# Patient Record
Sex: MALE | Race: OTHER
[De-identification: names, ages, dates, MRNs, and addresses within clinical notes are randomized per-mention and may not be internally consistent; named-entity substitution may affect disease eponyms.]

---

## 2017-07-10 ENCOUNTER — HOSPITAL ENCOUNTER (EMERGENCY)
Dept: HOSPITAL 25 - ED | Age: 57
Discharge: HOME | End: 2017-07-10
Payer: COMMERCIAL

## 2017-07-10 VITALS — DIASTOLIC BLOOD PRESSURE: 73 MMHG | SYSTOLIC BLOOD PRESSURE: 131 MMHG

## 2017-07-10 DIAGNOSIS — F17.210: ICD-10-CM

## 2017-07-10 DIAGNOSIS — I44.4: ICD-10-CM

## 2017-07-10 DIAGNOSIS — J20.9: Primary | ICD-10-CM

## 2017-07-10 DIAGNOSIS — J44.1: ICD-10-CM

## 2017-07-10 LAB
ALBUMIN SERPL BCG-MCNC: 4.1 G/DL (ref 3.2–5.2)
ALP SERPL-CCNC: 80 U/L (ref 34–104)
ALT SERPL W P-5'-P-CCNC: 17 U/L (ref 7–52)
ANION GAP SERPL CALC-SCNC: 8 MMOL/L (ref 2–11)
AST SERPL-CCNC: 26 U/L (ref 13–39)
BUN SERPL-MCNC: 13 MG/DL (ref 6–24)
BUN/CREAT SERPL: 16.7 (ref 8–20)
CALCIUM SERPL-MCNC: 9.5 MG/DL (ref 8.6–10.3)
CHLORIDE SERPL-SCNC: 98 MMOL/L (ref 101–111)
CK SERPL-CCNC: 237 U/L (ref 10–223)
GLOBULIN SER CALC-MCNC: 3.3 G/DL (ref 2–4)
GLUCOSE SERPL-MCNC: 139 MG/DL (ref 70–100)
HCO3 SERPL-SCNC: 29 MMOL/L (ref 22–32)
HCT VFR BLD AUTO: 51 % (ref 42–52)
HGB BLD-MCNC: 17.2 G/DL (ref 14–18)
MCH RBC QN AUTO: 30 PG (ref 27–31)
MCHC RBC AUTO-ENTMCNC: 33 G/DL (ref 31–36)
MCV RBC AUTO: 90 FL (ref 80–94)
POTASSIUM SERPL-SCNC: 4 MMOL/L (ref 3.5–5)
PROT SERPL-MCNC: 7.4 G/DL (ref 6.4–8.9)
RBC # BLD AUTO: 5.72 10^6/UL (ref 4–5.4)
SODIUM SERPL-SCNC: 135 MMOL/L (ref 133–145)
TROPONIN I SERPL-MCNC: 0.01 NG/ML (ref ?–0.04)
WBC # BLD AUTO: 17.4 10^3/UL (ref 3.5–10.8)

## 2017-07-10 PROCEDURE — 80053 COMPREHEN METABOLIC PANEL: CPT

## 2017-07-10 PROCEDURE — 94640 AIRWAY INHALATION TREATMENT: CPT

## 2017-07-10 PROCEDURE — 82550 ASSAY OF CK (CPK): CPT

## 2017-07-10 PROCEDURE — 85610 PROTHROMBIN TIME: CPT

## 2017-07-10 PROCEDURE — 83605 ASSAY OF LACTIC ACID: CPT

## 2017-07-10 PROCEDURE — 87040 BLOOD CULTURE FOR BACTERIA: CPT

## 2017-07-10 PROCEDURE — 96360 HYDRATION IV INFUSION INIT: CPT

## 2017-07-10 PROCEDURE — 82553 CREATINE MB FRACTION: CPT

## 2017-07-10 PROCEDURE — 87185 SC STD ENZYME DETCJ PER NZM: CPT

## 2017-07-10 PROCEDURE — 84484 ASSAY OF TROPONIN QUANT: CPT

## 2017-07-10 PROCEDURE — 71275 CT ANGIOGRAPHY CHEST: CPT

## 2017-07-10 PROCEDURE — 87070 CULTURE OTHR SPECIMN AEROBIC: CPT

## 2017-07-10 PROCEDURE — 85379 FIBRIN DEGRADATION QUANT: CPT

## 2017-07-10 PROCEDURE — 85025 COMPLETE CBC W/AUTO DIFF WBC: CPT

## 2017-07-10 PROCEDURE — 93005 ELECTROCARDIOGRAM TRACING: CPT

## 2017-07-10 PROCEDURE — 86140 C-REACTIVE PROTEIN: CPT

## 2017-07-10 PROCEDURE — 87077 CULTURE AEROBIC IDENTIFY: CPT

## 2017-07-10 PROCEDURE — 83880 ASSAY OF NATRIURETIC PEPTIDE: CPT

## 2017-07-10 PROCEDURE — 36415 COLL VENOUS BLD VENIPUNCTURE: CPT

## 2017-07-10 PROCEDURE — 94760 N-INVAS EAR/PLS OXIMETRY 1: CPT

## 2017-07-10 PROCEDURE — 71010: CPT

## 2017-07-10 PROCEDURE — 87205 SMEAR GRAM STAIN: CPT

## 2017-07-10 PROCEDURE — 99284 EMERGENCY DEPT VISIT MOD MDM: CPT

## 2017-07-10 PROCEDURE — 85730 THROMBOPLASTIN TIME PARTIAL: CPT

## 2017-07-10 NOTE — RAD
Indication: Cough and shortness of breath. Symptoms for 2 days. Bronchitis. History of

tobacco use.



Comparison: June 27, 2017



Technique: Upright AP 0810 hours



Report: Elevated lung volumes and both diffuse mild prominence of the interstitial

markings and patchy rarefaction of the mid to upper lung zone interstitial markings. No

focal pulmonary lesion, compelling alveolar consolidation, pleural effusion, pneumothorax.

The heart, pulmonary vasculature, and mediastinal contours are unremarkable.



IMPRESSION: Stigmata of obstructive lung disease. No acute pulmonary or cardiac process

evident.

## 2017-07-10 NOTE — RAD
Indication: Shortness of breath, COPD with elevated d-dimer.



Contrast: Administered 60.9 ml of OMNIPAQUE 350 mgi/ml



CTA of the chest was performed after IV contrast administration. Coronal and sagittal

reconstructed images were obtained. The pulmonary arterial tree is well opacified. No

filling defect is present to suggest pulmonary embolus.



Inferior thyroid lobes are unremarkable. There is no mediastinal or hilar adenopathy

noted. The trachea and major bronchi appear patent. The lung fields demonstrate no

evidence of pleural fluid, nodules or masses.



The visualized bony structures are unremarkable.



IMPRESSION: No evidence of pulmonary embolus is noted.

## 2019-01-27 ENCOUNTER — HOSPITAL ENCOUNTER (EMERGENCY)
Dept: HOSPITAL 25 - ED | Age: 59
Discharge: HOME | End: 2019-01-27
Payer: COMMERCIAL

## 2019-01-27 VITALS — SYSTOLIC BLOOD PRESSURE: 149 MMHG | DIASTOLIC BLOOD PRESSURE: 96 MMHG

## 2019-01-27 DIAGNOSIS — J32.4: Primary | ICD-10-CM

## 2019-01-27 DIAGNOSIS — D49.0: ICD-10-CM

## 2019-01-27 DIAGNOSIS — F17.200: ICD-10-CM

## 2019-01-27 LAB
ALBUMIN SERPL BCG-MCNC: 3.2 G/DL (ref 3.2–5.2)
ALBUMIN/GLOB SERPL: 0.9 {RATIO} (ref 1–3)
ALP SERPL-CCNC: 83 U/L (ref 34–104)
ALT SERPL W P-5'-P-CCNC: 11 U/L (ref 7–52)
ANION GAP SERPL CALC-SCNC: 6 MMOL/L (ref 2–11)
AST SERPL-CCNC: 16 U/L (ref 13–39)
BASOPHILS # BLD AUTO: 0.1 10^3/UL (ref 0–0.2)
BUN SERPL-MCNC: 13 MG/DL (ref 6–24)
BUN/CREAT SERPL: 17.6 (ref 8–20)
CALCIUM SERPL-MCNC: 8.8 MG/DL (ref 8.6–10.3)
CHLORIDE SERPL-SCNC: 96 MMOL/L (ref 101–111)
EOSINOPHIL # BLD AUTO: 0 10^3/UL (ref 0–0.6)
GLOBULIN SER CALC-MCNC: 3.5 G/DL (ref 2–4)
GLUCOSE SERPL-MCNC: 136 MG/DL (ref 70–100)
HCO3 SERPL-SCNC: 34 MMOL/L (ref 22–32)
HCT VFR BLD AUTO: 42 % (ref 42–52)
HGB BLD-MCNC: 14.3 G/DL (ref 14–18)
LYMPHOCYTES # BLD AUTO: 1.6 10^3/UL (ref 1–4.8)
MCH RBC QN AUTO: 30 PG (ref 27–31)
MCHC RBC AUTO-ENTMCNC: 34 G/DL (ref 31–36)
MCV RBC AUTO: 87 FL (ref 80–94)
MONOCYTES # BLD AUTO: 0.7 10^3/UL (ref 0–0.8)
NEUTROPHILS # BLD AUTO: 10.5 10^3/UL (ref 1.5–7.7)
NRBC # BLD AUTO: 0 10^3/UL
NRBC BLD QL AUTO: 0
PLATELET # BLD AUTO: 332 10^3/UL (ref 150–450)
POTASSIUM SERPL-SCNC: 3.7 MMOL/L (ref 3.5–5)
PROT SERPL-MCNC: 6.7 G/DL (ref 6.4–8.9)
RBC # BLD AUTO: 4.77 10^6/UL (ref 4–5.4)
SODIUM SERPL-SCNC: 136 MMOL/L (ref 135–145)
WBC # BLD AUTO: 12.8 10^3/UL (ref 3.5–10.8)

## 2019-01-27 PROCEDURE — 96374 THER/PROPH/DIAG INJ IV PUSH: CPT

## 2019-01-27 PROCEDURE — 85025 COMPLETE CBC W/AUTO DIFF WBC: CPT

## 2019-01-27 PROCEDURE — 70491 CT SOFT TISSUE NECK W/DYE: CPT

## 2019-01-27 PROCEDURE — 99283 EMERGENCY DEPT VISIT LOW MDM: CPT

## 2019-01-27 PROCEDURE — 70486 CT MAXILLOFACIAL W/O DYE: CPT

## 2019-01-27 PROCEDURE — 36415 COLL VENOUS BLD VENIPUNCTURE: CPT

## 2019-01-27 PROCEDURE — 80053 COMPREHEN METABOLIC PANEL: CPT

## 2019-01-27 NOTE — XMS REPORT
Continuity of Care Document (CCD)

 Created on:2019



Patient:Milton Burton

Sex:Male

:1960

External Reference #:[id number]





Demographics







 Address  [address] [place], [place] [address]

 

 Home Phone  [phone]

 

 Mobile Phone  [phone]

 

 Work Phone  [phone]

 

 Email Address  [email or web address]

 

 Preferred Language  en

 

 Marital Status  Declined to Specify/Unknown

 

 Confucianism Affiliation  Unknown

 

 Race  White

 

 Ethnic Group  Declined to Specify/Unknown









Author







 Name  Harris Wilkerson MD

 

 Address  [address] [place] [address]









Care Team Providers







 Name  Role  Phone

 

 Harris Wilkerson MD FACP  Primary Care Physician  Unavailable









Payers







 Type  Date  Identification Numbers  Payment Provider  Subscriber

 

     Policy Number: [id number]  [employer]  Monica Burton









 PayID: [id number]   [id number]









 [place] [address]







Advance Directives







 Description

 

 No Information Available







Problems







 Date  Description  Provider  Status

 

 Onset: 2018  Impaired fasting glycaemia  Harris Wilkerson MD  Active

 

 Onset: 2018  Substance abuse counseling  Harris Wilkerson MD  Active

 

 Onset: 2018  H/O: peptic ulcer  Harris Wilkerson MD  Active

 

 Onset: 2017  Chronic obstructive lung disease  Harris Wilkerson MD  
Active

 

 Onset: 2009  Tobacco user  Harris Wilkerson MD  Active







Family History







 Date  Family Member(s)  Problem(s)  Comments

 

 :  (age 56  Father   due to MI  



 Years)      

 

   Father  Emphysema  Smoker

 

   Mother  Smoker  

 

 :  (age 64  Mother   due to Unknown Causes  "In her sleep"



 Years)      

 

   Mother  Arteriosclerosis  

 

   Mother  Peripheral Vascular Disease  



     (PVD)  

 

   Mother  Hypertension  

 

   First Brother  None  

 

   First Sister  None  







Social History







 Type  Date  Description  Comments

 

 Birth Sex    Unknown  

 

 Marital Status      2 boys

 

 Occupation    Manager  Property,



       owner/employed

 

 Work Status    Currently Working  

 

 Tobacco Use  Start: Unknown  current cigarette  2 PPD from age 20



     smoker  till 50. 1 PPD



       since 2009.

 

 Smoking Status  Reviewed: 10/24/17  current cigarette  2 PPD from age 20



     smoker  till 50. 1 PPD



       since 2009.

 

 ETOH Use  18 Years  Currently consumes  10-15 beer per week



     alcohol  "Average" "not



       daily"

 

 Recreational Drug Use    Denies Drug Use  

 

 Tobacco Use  Start: Unknown  Patient is a current  



     smoker, smokes every  



     day  

 

 Exercise Type/Frequency    Does not exercise  "Very active at



       work"

 

 Guns in Home    Yes, Locked Up  

 

 Currently Active    Patient is currently  



     sexually active  







Allergies, Adverse Reactions, Alerts







 Description

 

 No Known Drug Allergies







Medications







 Medication  Date  Status  Form  Strength  Qnty  SIG  Indications  Ordering



                 Provider

 

 Fluconazole    Active  Tablets  100mg  14tabs  take 1  B37.81  Iglesias 
A



   019          tablet by    MD Rhea



             mouth daily    



             for 14 days    



             for terence    



             infection    









 B37.0









 Ferrex 150  2018  Active  Capsules  150mg  60caps  take 1  D50.9  
Iglesias A



             capsule by    MD Rhea



             mouth twice    



             daily for    



             iron    



             deficiency    



             anemia    

 

 Atorvastatin  2018  Active  Tablets  20mg  30tabs  take one  Z82.49  
Iglesias A



 Calcium            tablet by    MD Rhea



             mouth in the    



             evening for    



             high    



             cholesterol    

 

 Omeprazole  08/10/2017  Active  Capsules  20mg  30caps  take 1  K26.9  
Iglesias A



       DR      capsule by    MD Rhea



             mouth once    



             daily    









 Z87.11









 Flovent HFA  07/10/2017  Active  Aerosol  220mcg/Act  24gm  inhale 1  J44.9  
Iglesias A



             puff twice a    MD Rhea



             day    

 

 Ventolin HFA  07/10/2017  Active  Aerosol  108(90Base)  8gm  inhale 2  J44.1  
Iglesias A



         mcg/Act    puffs every    MD Rhea



             4 hours as    



             needed for    



             shortness of    



             breath or    



             whee    









 J44.9









                 

 

 Nasonex  /  Hx  Suspension  50mcg  17gm  1 intranasal  J32.9  Iglesias A



   2019 -      /Act    twice a day    MD Rhea



   2019              

 

 Prednisone  /  Hx  Tablets  20mg  19tab  3 by mouth with  J01.9  Iglesias 
A



   2019 -        s  food daily for  0  MD Rhea



             3 days and then    



             2 daily for 3    



             days and then 1    



             daily for 4    



             days    

 

 Cefuroxime Axetil  /  Hx  Tablets  500mg      J01.9  Unknown



   2018 -            0  



   2019              

 

 Amoxicillin/Clavu  17/  Hx  Tablets  875-1    take 1 tablet  J01.9  Unknown



 lanate Potassium  2018 -      25mg    by mouth twice  0  



   /          a day for 7    



             days    

 

 Fluticasone  /  Hx  Suspension  50mcg    Instill 2  J01.9  Unknown



 Propionate  2018 -      /Act    Sprays Into  0  



   /          Each Nostil    



   2018          Once Daily    

 

 Wellbutrin SR  10/24/  Hx  Tablets ER 12HR  150mg  60tab  1 by mouth  Z71.6  
Iglesias A



    -        s  twice a day    MD Rhea



   10/24/              



   2017              

 

 Doxycycline  07/10/  Hx  Capsules  100mg    take 1 capsule  J44.1  [facility]



 Hyclate  2017 -          by mouth twice    Physician



   07/15/          a day    



   2017              

 

 Prednisone  07/10/  Hx  Tablets  20mg    take 2 tablets  J44.1  [facility]



   2017 -          by mouth once    Physician



   07/15/          daily    



   2017              

 

 Pravastatin  /  Hx  Tablets  20mg  30tab  1 po qd qhs  272.0  Iglesias A



 Sodium   -        s      MD Rhea



   2010              

 

 Meloxicam  /  Hx  Tablets  7.5mg  30tab  1-2 po qd  726.5  Iglesias A



    -        s      MD Rhea



   2010              

 

 Wellbutrin SR  /  Hx  Tablets ER 12HR  150mg  60tab  1 po bid  305.1  
Iglseias A



    -        s      MD Rhea



   2010              

 

 Zolpidem Tartrate  /  Hx  Tablets  10mg  5tabs  1/2-1 po qhs  780.5  
Harris LUQUE



    -          prn  2  MD Rhea



   2010              

 

 Chantix  /  Hx  Tablets  1mg  60tab  days 1-3 1  305.1  Iglesias REBEL



    -        s  once daily    MD Rhea



   /                     days    



   2010          4-7 1/ twice    



             daily    



                  day 8    



             onwards 1 twice    



             daily    







Immunizations







 CPT Code  Status  Date  Vaccine  Lot #

 

 [id number]  Given  10/25/2018  Influenza Vaccine Quadrivalent  [id number]



       Preser/Antibiotic Free Im Use  

 

 09699  Given  10/24/2017  Influenza Vaccine Quadrivalent  [id number]



       Preser/Antibiotic Free Im Use  

 

 03777  Given  2017  Pneumococcal Vaccine 2Yrs Or Older  [id number]

 

 16580  Given  2009  Tdap-Tetanus, Diphtheria  TDAP [id number] (2)



       Toxoids/Acellular Pertussis Vaccine 7+  







Vital Signs







 Date  Vital  Result  Comment

 

 2019 12:53pm  Body Temperature  98.2 F  

 

 2019 11:58am  Body Temperature  98.3 F  

 

 2019 10:43am  Body Temperature  97.8 F  









 Heart Rate  70 /min  

 

 BP Systolic  125 mmHg  

 

 BP Diastolic  75 mmHg  









 10/25/2018 11:56am  Heart Rate  64 /min  









 BP Systolic  140 mmHg  

 

 BP Diastolic  80 mmHg  

 

 BP Systolic Recheck  125 mmHg  

 

 BP Diastolic Recheck  75 mmHg  

 

 BMI (Body Mass Index)  23.6 kg/m2  

 

 Weight  144.00 lb  

 

 Height  65.5 inches  5'5.50"









 2018  9:57am  Heart Rate  60 /min  









 BP Systolic  110 mmHg  

 

 BP Diastolic  80 mmHg  

 

 Weight  145.00 lb  









 2018 11:24am  Heart Rate  66 /min  









 BP Systolic  125 mmHg  

 

 BP Diastolic  80 mmHg  

 

 Weight  143.00 lb  









 2018 11:53am  Heart Rate  72 /min  









 BP Systolic  135 mmHg  

 

 BP Diastolic  80 mmHg  

 

 Weight  144.00 lb  









 2017  2:07pm  Heart Rate  76 /min  









 BP Systolic  125 mmHg  

 

 BP Diastolic  80 mmHg  









 2017 11:53am  Weight  138.00 lb  

 

 10/24/2017 10:49am  Body Temperature  97.8 F  









 O2 % BldC Oximetry  96 %  

 

 Heart Rate  77 /min  

 

 BP Systolic  130 mmHg  

 

 BP Diastolic  80 mmHg  

 

 BMI (Body Mass Index)  22.6 kg/m2  

 

 Weight  138.00 lb  

 

 Height  65.50 inches  5'5.50"









 2017 12:07pm  Weight  133.00 lb  

 

 2017 11:12am  Weight  129.00 lb  

 

 2017 10:03am  Heart Rate  60 /min  









 BP Systolic  125 mmHg  

 

 BP Diastolic  80 mmHg  

 

 Weight  129.00 lb  









 2017 10:58am  Weight  129.00 lb  

 

 2017 11:18am  Weight  126.00 lb  

 

 2017 10:43am  Weight  134.00 lb  

 

 2017  1:11pm  Weight  128.00 lb  

 

 2017  2:44pm  Body Temperature  98.2 F  









 O2 % BldC Oximetry  95 %  

 

 Heart Rate  78 /min  

 

 BP Systolic  128 mmHg  

 

 BP Diastolic  80 mmHg  

 

 Weight  134.00 lb  









 2010 10:09am  Heart Rate  76 /min  









 BP Systolic  125 mmHg  

 

 BP Diastolic  85 mmHg  

 

 BMI (Body Mass Index)  26.5 kg/m2  

 

 Weight  164.00 lb  

 

 Height  66 inches  5'6"









 2010  5:31pm  Heart Rate  66 /min  









 BP Systolic  135 mmHg  

 

 BP Diastolic  85 mmHg  









 2010 12:48pm  Heart Rate  80 /min  









 BP Systolic  145 mmHg  

 

 BP Diastolic  85 mmHg  









 2010 10:31am  Body Temperature  98.0 F  









 Heart Rate  64 /min  

 

 BP Systolic  130 mmHg  Supine and standing

 

 BP Diastolic  80 mmHg  Supine and standing

 

 BMI (Body Mass Index)  27.1 kg/m2  

 

 Weight  168.00 lb  with shoes

 

 Height  66 inches  5'6"









 2009  9:13am  Body Temperature  98.5 F  









 O2 % BldC Oximetry  95 %  

 

 Heart Rate  71 /min  

 

 BP Systolic  135 mmHg  

 

 BP Diastolic  90 mmHg  

 

 BP Systolic Recheck  135 mmHg  

 

 BP Diastolic Recheck  90 mmHg  

 

 BMI (Body Mass Index)  26.8 kg/m2  

 

 Weight  166.00 lb  

 

 Height  66 inches  5'6"







Results







 Test  Date  Facility  Test  Result  H/L  Range  Note

 

 Urinalysis Profile  10/11/2018  [facility]  Urine Color  Yellow    
  



     [place] [address] [phone]          









 Urine Appearance  Clear      

 

 Urine Specific Gravity  1.016  N  1.010-1.030  

 

 Urine pH  5.0  N  5-9  

 

 Urine Urobilinogen  Negative    Negative  

 

 Urine Ketones  Negative    Negative  

 

 Urine Protein  Negative    Negative  

 

 Urine Leukocytes  Negative    Negative  

 

 Urine Blood  Negative    Negative  

 

 Urine Nitrite  Negative    Negative  

 

 Urine Bilirubin  Negative    Negative  

 

 Urine Glucose  Negative    Negative  









 Lipid Profile  10/11/2018  [facility]  Triglycerides  63 mg/dL    <
150  1



 (Trig/Chol/HDL)    [place], [place] [id number] [phone]          









 Cholesterol  149 mg/dL    <200  2

 

 HDL Cholesterol  53.8 mg/dL    >40  3

 

 LDL Cholesterol  83 mg/dL    <100  4









 CBC No Diff  10/11/2018  [facility]  White Blood  9.1 10^3/uL  N  
3.5-10.8  



     [place] [address]  Count        



     [phone]          









 Red Blood Count  4.71 10^6/uL  N  4.00-5.40  

 

 Hemoglobin  14.4 g/dL  N  14.0-18.0  

 

 Hematocrit  42 %  N  42-52  

 

 Mean Corpuscular Volume  88 fL  N  80-94  

 

 Mean Corpuscular Hemoglobin  31 pg  N  27-31  

 

 Mean Corpuscular HGB Conc  35 g/dL  N  31-36  

 

 Red Cell Distribution Width  14 %  N  10.5-15  

 

 Platelet Count  212 10^3/uL  N  150-450  

 

 Mean Platelet Volume  8.0 um3  N  7.4-10.4  









 Laboratory test  10/11/2018  [facility]  Ferritin  66.5 ng/mL  N  24
-336  5



 finding    [place], [place] [id number] [phone]          

 

 Iron & Iron Binding  10/11/2018  [facility]  Iron  85 g/dL  N  50-
212  



 Capacity    [place], [place] [address] [phone]          









 Unsaturated Iron Binding  215 g/dL      

 

 Total Iron Binding Capacity  300 g/dL  N  250-450  

 

 Transferrin  214 mg/dL  N  203-362  

 

 % Iron Saturation  28 %  N  15-55  









 Comp Metabolic Panel  10/11/2018  [facility]  Sodium  141 mmol/L  N
  135-145  



     [place] [address] [phone]          









 Potassium  4.8 mmol/L  N  3.5-5.0  

 

 Chloride  104 mmol/L  N  101-111  

 

 Co2 Carbon Dioxide  32 mmol/L  N  22-32  

 

 Anion Gap  5 mmol/L  N  2-11  

 

 Glucose  82 mg/dL  N    

 

 Blood Urea Nitrogen  16 mg/dL  N  6-24  

 

 Creatinine  0.92 mg/dL  N  0.67-1.17  

 

 BUN/Creatinine Ratio  17.4  N  8-20  

 

 Calcium  9.1 mg/dL  N  8.6-10.3  

 

 Total Protein  5.7 g/dL  Low  6.4-8.9  

 

 Albumin  4.1 g/dL  N  3.2-5.2  

 

 Globulin  1.6 g/dL  Low  2-4  

 

 Albumin/Globulin Ratio  2.6  N  1-3  

 

 Total Bilirubin  0.60 mg/dL  N  0.2-1.0  

 

 Alkaline Phosphatase  61 U/L  N    

 

 Alt  16 U/L  N  7-52  

 

 Ast  23 U/L  N  13-39  

 

 Egfr Non-  85.4    >60  

 

 Egfr   103.4    >60  6









 Hemoglobin/Hematacrit  2018  [facility]  Hemoglobin  14.2  N  
14.0-18.0  



     [place] [address]    g/dL      



     [phone]          









 Hematocrit  41 %  Low  42-52  









 Laboratory test  2018  [facility]  Iron  42 g/dL  Low  50-
212  



 [place] [address] [phone]          

 

 Iron & Iron  2018  [facility]  Unsaturated Iron  284 g/dL  
    



 [place] [address]  Binding        



 Capacity    [phone]          









 Total Iron Binding Capacity  326 g/dL  N  250-450  

 

 Transferrin  233 mg/dL  N  203-362  

 

 % Iron Saturation  13 %  Low  15-55  









 Hemoglobin/Hematacrit  2018  [facility]  Hemoglobin  13.7  
Low  14.0-18.0  



     [place] [address]    g/dL      



     [phone]          









 Hematocrit  40 %  Low  42-52  









 Laboratory test  2018  [facility]  Iron  71 g/dL  N  
  



 [place] [address]          



     [phone]          

 

 Iron & Iron  2018  [facility]  Unsaturated Iron  261 g/dL  
    



 [place] [address]  Binding        



     [phone]          









 Total Iron Binding Capacity  332 g/dL  N  250-450  

 

 Transferrin  237 mg/dL  N  203-362  

 

 % Iron Saturation  21 %  N  15-55  









 Laboratory test  2018  [facility]  Surgical  SEE RESULT      7
, 8



 [place] [address]  Pathology  BELOW      



     [phone]          

 

 Laboratory test  2018  [facility]  Clotest  SEE RESULT      9
, 10



 [place] [address]    BELOW      



     [phone]          

 

 Stool Occult  2018  [facility]  Stool Occult  SEE RESULT      
11, 12



 [place] [address]  Blood, Diag  BELOW      



     [phone]          

 

 CBC Auto Diff  2018  [facility]  White Blood  9.0 10^3/uL  N  
3.5-1  



     [place] [address]  Count      0.8  



     [phone]          









 Red Blood Count  4.42 10^6/uL  N  4.0-5.4  

 

 Hemoglobin  13.5 g/dL  Low  14.0-18.0  

 

 Hematocrit  39 %  Low  42-52  

 

 Mean Corpuscular Volume  89 fL  N  80-94  

 

 Mean Corpuscular Hemoglobin  31 pg  N  27-31  

 

 Mean Corpuscular HGB Conc  35 g/dL  N  31-36  

 

 Red Cell Distribution Width  14 %  N  10.5-15  

 

 Platelet Count  218 10^3/uL  N  150-450  

 

 Mean Platelet Volume  8.6 um3  N  7.4-10.4  

 

 Abs Neutrophils  4.9 10^3/uL  N  1.5-7.7  

 

 Abs Lymphocytes  3.0 10^3/uL  N  1.0-4.8  

 

 Abs Monocytes  0.7 10^3/uL  N  0-0.8  

 

 Abs Eosinophils  0.3 10^3/uL  N  0-0.6  

 

 Abs Basophils  0 10^3/uL  N  0-0.2  

 

 Abs Nucleated RBC  0 10^3/uL      

 

 Granulocyte %  54.7 %  N  38-83  

 

 Lymphocyte %  33.6 %  N  25-47  

 

 Monocyte %  8.2 %  High  0-7  

 

 Eosinophil %  3.0 %  N  0-6  

 

 Basophil %  0.5 %  N  0-2  

 

 Nucleated Red Blood Cells %  0.1      









 Laboratory test  2018  [facility]  Glucose  101 mg/dL  High  
  13



 [place] [address] [phone]          









 Hemoglobin A1c (Glyco HGB)  5.3 %  N  4.0-5.6  14









 Laboratory test finding  2018  [facility]  Alt  18 U/L  N  7-
52  15



     [place] [address] [phone]          









 Creatine Kinase(CK)  155 U/L  N    16









 Laboratory test  2017  [facility]  Ferritin  123.7 ng/mL  N  
  17



 [place] [address] [phone]          

 

 Iron & Iron Binding  2017  [facility]  Iron  103 g/dL  N  50
-212  



 [place] [address] [phone]          









 Unsaturated Iron Binding  247 g/dL      

 

 Total Iron Binding Capacity  350 g/dL  N  250-450  

 

 % Iron Saturation  29 %  N  15-55  









 Comp Metabolic Panel  2017  [facility]  Sodium  137 mmol/L  N
  133-145  



     [place] [address] [phone]          









 Potassium  4.1 mmol/L  N  3.5-5.0  

 

 Chloride  101 mmol/L  N  101-111  

 

 Co2 Carbon Dioxide  32 mmol/L  N  22-32  

 

 Anion Gap  4 mmol/L  N  2-11  

 

 Glucose  98 mg/dL  N    

 

 Blood Urea Nitrogen  13 mg/dL  N  6-24  

 

 Creatinine  0.89 mg/dL  N  0.67-1.17  

 

 BUN/Creatinine Ratio  14.6  N  8-20  

 

 Calcium  9.3 mg/dL  N  8.6-10.3  

 

 Total Protein  6.7 g/dL  N  6.4-8.9  

 

 Albumin  4.5 g/dL  N  3.2-5.2  

 

 Globulin  2.2 g/dL  N  2-4  

 

 Albumin/Globulin Ratio  2.0  N  1-3  

 

 Total Bilirubin  0.70 mg/dL  N  0.2-1.0  

 

 Alkaline Phosphatase  54 U/L  N    

 

 Alt  18 U/L  N  7-52  

 

 Ast  18 U/L  N  13-39  

 

 Egfr Non-  89.1    >60  

 

 Egfr   114.6    >60  18









 CBC No Diff  2017  [facility]  White Blood  13.6 10^3/uL  
High  3.5-10.8  



     [place] [address]  Count        



     [phone]          









 Red Blood Count  5.28 10^6/uL  N  4.0-5.4  

 

 Hemoglobin  15.8 g/dL  N  14.0-18.0  

 

 Hematocrit  47 %  N  42-52  

 

 Mean Corpuscular Volume  89 fL  N  80-94  

 

 Mean Corpuscular Hemoglobin  30 pg  N  27-31  

 

 Mean Corpuscular HGB Conc  34 g/dL  N  31-36  

 

 Red Cell Distribution Width  14 %  N  10.5-15  

 

 Platelet Count  263 10^3/uL  N  150-450  

 

 Mean Platelet Volume  8 um3  N  7.4-10.4  









 Laboratory test  08/10/2017  [facility]  Clotest  SEE RESULT      19



 [place] [address]    BELOW      



     [phone]          

 

 Laboratory test  2017  [facility]  Creatine  129 U/L  N  10-
223  20



 [place] [address]  Kinase(CK)        



     [phone]          









 Erythrocyte Sed Rate  6 mm/Hr  N  0-20  21

 

 C Reactive Protein  1.27 mg/L  N  < 5.00  22









 Lipid Profile  2017  [facility]  Triglycerides  59 mg/dL  N  <
150  23



 (Trig/[place] [address]          



     [phone]          









 Cholesterol  147 mg/dL  N  <200  24

 

 HDL Cholesterol  60.9 mg/dL  N  >40  25

 

 LDL Cholesterol  74 mg/dL  N  <100  26









 CBC W/Diff  2017  [facility]  White Blood Count  10.2 
     









 RBC Red Blood Count  3.97  Low  4.60-6.20 x10 6L  

 

 Hemoglobin  11.6  Low  14-18 G/DL  

 

 Hematocrit  36.7  Low  42-52 %  

 

 MCV (Corpuscular Volume)  92.3      

 

 MCH (Corpuscular Hemoglobin)  29.2      

 

 MCHC (Corpuscular Hemog Conc)  31.6  Low  32.0-36.0 G/DL  

 

 RDW  15.5  High  10.5-15.0 %  

 

 Platelet Count  228      

 

 MPV  6.7      









 CBC Auto Diff  2017  [facility]  White Blood  9.0 10^3/uL  N  
3.5-10.8  



     [place] [address]  Count        



     [phone]          









 Red Blood Count  4.54 10^6/uL  N  4.0-5.4  

 

 Hemoglobin  13.7 g/dL  Low  14.0-18.0  

 

 Hematocrit  41 %  Low  42-52  

 

 Mean Corpuscular Volume  90 fL  N  80-94  

 

 Mean Corpuscular Hemoglobin  30 pg  N  27-31  

 

 Mean Corpuscular HGB Conc  34 g/dL  N  31-36  

 

 Red Cell Distribution Width  13 %  N  10.5-15  

 

 Platelet Count  259 10^3/uL  N  150-450  

 

 Mean Platelet Volume  8 um3  N  7.4-10.4  

 

 Abs Neutrophils  7.9 10^3/uL  High  1.5-7.7  

 

 Abs Lymphocytes  0.8 10^3/uL  Low  1.0-4.8  

 

 Abs Monocytes  0.3 10^3/uL  N  0-0.8  

 

 Abs Eosinophils  0 10^3/uL  N  0-0.6  

 

 Abs Basophils  0 10^3/uL  N  0-0.2  

 

 Abs Nucleated RBC  0 10^3/uL  N    

 

 Granulocyte %  88.1 %  High  38-83  

 

 Lymphocyte %  8.7 %  Low  25-47  

 

 Monocyte %  3.0 %  N  1-9  

 

 Eosinophil %  0 %  N  0-6  

 

 Basophil %  0.2 %  N  0-2  

 

 Nucleated Red Blood Cells %  0  N    









 Laboratory test  2017  [facility]  Lipase  32 U/L  N  11.0-
82.0  



 [place] [address]          



     [phone]          

 

 Iron & Iron Binding  2017  [facility]  Iron  44 g/dL  Low  
  



 [place] [address]          



     [phone]          









 Unsaturated Iron Binding  226 g/dL  N    

 

 Total Iron Binding Capacity  270 g/dL  N  250-450  

 

 % Iron Saturation  16 %  N  15-55  









 Laboratory test  2017  [facility]  Ferritin  217.0 ng/mL  N  
  



 [place] [address]          



     [phone]          









 Vitamin B12  792 pg/mL  N  180-914  27









 Transglutaminase Igg  2017  [facility]  Tissue  <1.2  N    28



 [place] [address]  Transglutaminase IgA  U/mL      



     [phone]  Ab        









 Tissue Transglutaminase IgG Ab  <1.2 U/mL  N    29









 Laboratory test  07/10/2017  [facility]  Inr/Protime  1.01  N  0.89-
1.11  



 [place] [address]          



     [phone]          









 Activated Partial Thrombo Time  37.0 seconds  High  26.0-36.3  

 

 D Dimer Quantitative  382 ng/mL  High  Less Than 230  30

 

 Lactic Acid  1.6 mmol/L  N  0.5-2.0  31









 CBC Auto  07/10/2017  [facility]  White Blood  17.4 10^3/uL  High  
3.5-10.8  



 [place] [address]  Count        



     [phone]          









 Red Blood Count  5.72 10^6/uL  High  4.0-5.4  

 

 Hemoglobin  17.2 g/dL  N  14.0-18.0  

 

 Hematocrit  51 %  N  42-52  

 

 Mean Corpuscular Volume  90 fL  N  80-94  

 

 Mean Corpuscular Hemoglobin  30 pg  N  27-31  

 

 Mean Corpuscular HGB Conc  33 g/dL  N  31-36  

 

 Red Cell Distribution Width  13 %  N  10.5-15  

 

 Platelet Count  194 10^3/uL  N  150-450  

 

 Mean Platelet Volume  9 um3  N  7.4-10.4  

 

 Abs Neutrophils  15.5 10^3/uL  High  1.5-7.7  

 

 Abs Lymphocytes  0.8 10^3/uL  Low  1.0-4.8  

 

 Abs Monocytes  1.0 10^3/uL  High  0-0.8  

 

 Abs Eosinophils  0 10^3/uL  N  0-0.6  

 

 Abs Basophils  0.1 10^3/uL  N  0-0.2  

 

 Abs Nucleated RBC  0 10^3/uL  N    

 

 Granulocyte %  89.0 %  High  38-83  

 

 Lymphocyte %  4.6 %  Low  25-47  

 

 Monocyte %  6.0 %  N  1-9  

 

 Eosinophil %  0 %  N  0-6  

 

 Basophil %  0.4 %  N  0-2  

 

 Nucleated Red Blood Cells %  0  N    









 Comp Metabolic Panel  07/10/2017  [facility]  Sodium  135 mmol/L  N
  133-145  



     [place] [address]          



     [phone]          









 Potassium  4.0 mmol/L  N  3.5-5.0  

 

 Chloride  98 mmol/L  Low  101-111  

 

 Co2 Carbon Dioxide  29 mmol/L  N  22-32  

 

 Anion Gap  8 mmol/L  N  2-11  

 

 Glucose  139 mg/dL  High    

 

 Blood Urea Nitrogen  13 mg/dL  N  6-24  

 

 Creatinine  0.78 mg/dL  N  0.67-1.17  

 

 BUN/Creatinine Ratio  16.7  N  8-20  

 

 Calcium  9.5 mg/dL  N  8.6-10.3  

 

 Total Protein  7.4 g/dL  N  6.4-8.9  

 

 Albumin  4.1 g/dL  N  3.2-5.2  

 

 Globulin  3.3 g/dL  N  2-4  

 

 Albumin/Globulin Ratio  1.2  N  1-3  

 

 Total Bilirubin  1.20 mg/dL  High  0.2-1.0  

 

 Alkaline Phosphatase  80 U/L  N    

 

 Alt  17 U/L  N  7-52  

 

 Ast  26 U/L  N  13-39  

 

 Egfr Non-  103.7  N  >60  

 

 Egfr   133.4  N  >60  32









 Laboratory test  07/10/2017  [facility]  Creatine  237 U/L  High  10
-223  



 [place] [address]  Kinase(CK)        



     [phone]          









 C Reactive Protein  122.79 mg/L  High  < 5.00  33

 

 Troponin I  0.01 ng/mL  N  <0.04  









 CKMB  07/10/2017  [facility]  CKMB  ng/mL  9.8 ng/mL  High  0.6-6.3
  



     [place] [address]          



     [phone]          

 

 Laboratory test  07/10/2017  [facility]  B Type  96 pg/mL  N    34



 [place] [address]  Natriuretic        



     [phone]  Peptide        









 Blood Culture  SEE RESULT BELOW      35









 Laboratory test  07/10/2017  [facility]  Sputum  SEE RESULT      36



 [place] [address]  Culture &  BELOW      



     [phone]  Gram Stain        

 

 Xray  2017  [facility]  Chest, 2  COPD      



     [address]  Views        



     [place], [place] [address] [phone]          

 

 CBC Auto Diff  2017  [facility]  White Blood  10.2 10^3/uL  N
  3.5-10  



     [place] [address]  Count      .8  



     [phone]          









 Red Blood Count  4.38 10^6/uL  N  4.0-5.4  

 

 Hemoglobin  13.1 g/dL  Low  14.0-18.0  

 

 Hematocrit  39 %  Low  42-52  

 

 Mean Corpuscular Volume  89 fL  N  80-94  

 

 Mean Corpuscular Hemoglobin  30 pg  N  27-31  

 

 Mean Corpuscular HGB Conc  34 g/dL  N  31-36  

 

 Red Cell Distribution Width  13 %  N  10.5-15  

 

 Platelet Count  234 10^3/uL  N  150-450  

 

 Mean Platelet Volume  9 um3  N  7.4-10.4  

 

 Abs Neutrophils  6.5 10^3/uL  N  1.5-7.7  

 

 Abs Lymphocytes  2.2 10^3/uL  N  1.0-4.8  

 

 Abs Monocytes  1.1 10^3/uL  High  0-0.8  

 

 Abs Eosinophils  0.3 10^3/uL  N  0-0.6  

 

 Abs Basophils  0.1 10^3/uL  N  0-0.2  

 

 Abs Nucleated RBC  0 10^3/uL  N    

 

 Granulocyte %  64.0 %  N  38-83  

 

 Lymphocyte %  21.9 %  Low  25-47  

 

 Monocyte %  10.6 %  High  1-9  

 

 Eosinophil %  2.8 %  N  0-6  

 

 Basophil %  0.7 %  N  0-2  

 

 Nucleated Red Blood Cells %  0  N    









 Laboratory test  2017  [facility]  TSH (Thyroid  1.36 mcIU/mL
  N  0.34-5.60  



 [place] [address]  Stim Horm)        



     [phone]          









 Free T4 (Free Thyroxine)  1.05 ng/dL  N  0.61-1.12  

 

 T3 Free  3.50 pg/mL  N  2.5-3.9  









 Comp Metabolic Panel  2017  [facility]  Sodium  138 mmol/L  N
  133-145  



     [place], [place] [id number] [phone]          









 Potassium  3.9 mmol/L  N  3.5-5.0  

 

 Chloride  97 mmol/L  Low  101-111  

 

 Co2 Carbon Dioxide  35 mmol/L  High  22-32  

 

 Anion Gap  6 mmol/L  N  2-11  

 

 Glucose  105 mg/dL  High    

 

 Blood Urea Nitrogen  10 mg/dL  N  6-24  

 

 Creatinine  0.78 mg/dL  N  0.67-1.17  

 

 BUN/Creatinine Ratio  12.8  N  8-20  

 

 Calcium  8.9 mg/dL  N  8.6-10.3  

 

 Total Protein  5.9 g/dL  Low  6.4-8.9  

 

 Albumin  3.7 g/dL  N  3.2-5.2  

 

 Globulin  2.2 g/dL  N  2-4  

 

 Albumin/Globulin Ratio  1.7  N  1-3  

 

 Total Bilirubin  0.60 mg/dL  N  0.2-1.0  

 

 Alkaline Phosphatase  63 U/L  N    

 

 Alt  22 U/L  N  7-52  

 

 Ast  20 U/L  N  13-39  

 

 Egfr Non-  103.7  N  >60  

 

 Egfr   133.4  N  >60  37









 Laboratory test  2017  [facility]  PSA Diagnostic  0.144  N  0
-4.000  38



 [place] [address]    ng/mL      



     [phone]          

 

 Urinalysis  2017  [facility]  Urine Color  Straw  N    



 [place] [address]          



     [phone]          









 Urine Appearance  Clear  N    

 

 Urine Specific Gravity  1.004  Low  1.010-1.030  

 

 Urine pH  8.0  N  5-9  

 

 Urine Urobilinogen  Negative  N  Negative  

 

 Urine Ketones  Negative  N  Negative  

 

 Urine Protein  Negative  N  Negative  

 

 Urine Leukocytes  Negative  N  Negative  

 

 Urine Blood  Negative  N  Negative  

 

 Urine Nitrite  Negative  N  Negative  

 

 Urine Bilirubin  Negative  N  Negative  

 

 Urine Glucose  Negative  N  Negative  









 Lipid Profile  2010  [facility]  Triglyceride  103 mg/dL    40
-200  



 (Trig/[place] [address]          



     [phone]          









 Cholesterol  202 mg/dL  High  Less Than 200  39

 

 High Density Lipoprotein  54 mg/dL    40-60  40

 

 Cholesterol/HDL Ratio  3.74 AVERAGE    1-4.97  

 

 Low Density Lipoprotein  127 mg/dL  High  Less Than 100  41









 Xray  2010  [facility]  Hip, Complete,  Mild OA      



     [address]  Min. Of 2 Views,        



     [place] [address]  LT        



     [phone]          

 

 Xray  2010  [facility]  CT, Head Or  Normal      



     101 DATES   Brain; Without        



     [place] [address]  Contrast        



     [phone]          

 

 Laboratory test  2010  [facility]  CPK (Creatine  127    0-
200  



 [place] [address]  Kinase)        



     [phone]          









 Erythrocyte Sed Rate  1 MM/HR    0-15  

 

 TSH  1.47 MIU/ML    0.34-5.60  

 

 CPK (Creatine Kinase)  127 U/L    0-200  









 Basic Metabolic Panel  2010  [facility]  Sodium  142 mmol/L  
  135-145  



     [place] [address]          



     [phone]          









 Potassium  5.8 mmol/L  High  3.5-5.0  

 

 Chloride  104 mmol/L    101-111  

 

 Co2 (Carbon Dioxide)  31.0 mmol/L    22-32  

 

 Anion Gap  7.0 mmol/L    2-11  42

 

 Glucose  85 mg/dL      43

 

 BUN  8 mg/dL    6-24  

 

 Creatinine  1.10 mg/dL    0.50-1.40  

 

 One Over Creatinine  0.90      

 

 BUN/Creatinine Ratio  7.3  Low  8-20  

 

 Calcium  9.8 mg/dL    8.1-9.9  44

 

 eGFR Non-  76.3    > 60  

 

 eGFR   92.3    > 60  45









 CBC No Diff  2010  [facility]  White Blood Count  9.2 CUMM    
4.8-10.8  



     [place], [place] [address] [phone]          









 Red Cell Count  5.50 CUMM    4.6-6.2  

 

 Hemoglobin  17.0 g/dL    14.0-18.0  

 

 Hematocrit  49 %    42-52  

 

 Mean Corpuscular Volume  88 um3    80-94  

 

 Mean Corpuscular Hemoglob  31 pg    27-31  

 

 Mean Corpuscular HGB Cone  35 g/dL    32-36  

 

 Platelet Count  240 CUMM    150-450  









 Xray  07/15/2009  [facility]  Chest, 2 Views  WNL      



     [address]          



     [place] [address] [phone]          

 

 Laboratory test  2009  [facility]  C Reactive  1.2 mg/L    < 
7.48  46, 47



 [place] [address]  Protein High        



     [phone]  Sensit        

 

 Urinalysis  2009  [facility]  Ua Color  YELLOW      



     [place], [place] [address] [phone]          









 Appearance-Urine  CLEAR      

 

 Specific Gravity-Ur  1.014    1.010-1.030  

 

 Esterase-Urine  NEGATIVE    Negative  

 

 Nitrite  NEGATIVE    Negative  

 

 Urobilinogen-Ur-DIP  NEGATIVE    Negative  

 

 Protein-Urine  NEGATIVE    Negative  

 

 PH-Urine  7.0    5-9  

 

 Blood-Urine  NEGATIVE    Negative  

 

 Ketones-Urine  NEGATIVE    Negative  

 

 Bilirubin-Ur  NEGATIVE    Negative  

 

 Glucose-Urine  NEGATIVE    Negative  









 Laboratory test  2009  [facility]  CRP High  1.2    <7.48  



 [place] [address]  Sensitivity        



     [phone]          

 

 Lipid Profile  2009  [facility]  Triglyceride  196 mg/dL    40
-200  



 (Trig/Chol[place] [address]          



     [phone]          









 Cholesterol  218 mg/dL  High  Less Than 200  48

 

 High Density Lipoprotein  48 mg/dL    40-60  49

 

 Cholesterol/HDL Ratio  4.54 AVERAGE    1-4.97  

 

 Low Density Lipoprotein  131 mg/dL  High  Less Than 100  50









 Comp Metabolic Panel  2009  [facility]  Sodium  139 mmol/L    
135-145  



     [place] [address]          



     [phone]          









 Potassium  5.0 mmol/L    3.5-5.0  

 

 Chloride  101 mmol/L    101-111  

 

 Co2 (Carbon Dioxide)  30.0 mmol/L    22-32  

 

 Anion Gap  8.0 mmol/L    2-11  51

 

 Glucose  84 mg/dL      52

 

 BUN  8 mg/dL    6-24  

 

 Creatinine  1.20 mg/dL    0.50-1.40  

 

 One Over Creatinine  0.80      

 

 BUN/Creatinine Ratio  6.7  Low  8-20  

 

 Calcium  9.7 mg/dL    8.1-9.9  53

 

 Total Protein  6.6 GM/DL    6.2-8.1  

 

 Albumin  4.2 GM/DL    3.6-5.4  

 

 Globulin  2.4 GM/DL    2-4  

 

 Albumin/Globulin Ratio  1.8    1-3  

 

 Bilirubin Total  0.5 mg/dL    0.4-1.5  54

 

 Alkaline Phosphatase  69 U/L      

 

 Alt (SGPT)  20 U/L    17-63  

 

 Ast (Sgot)  24 U/L    12-42  

 

 eGFR Non-  69.3    > 60  

 

 eGFR   83.8    > 60  55









 Hemogram  2009  [facility]  White Blood Count  8.9 CUMM    4.8
-10.8  



     [place] [address]          



     [phone]          









 Red Cell Count  5.25 CUMM    4.6-6.2  

 

 Hemoglobin  15.9 g/dL    14.0-18.0  

 

 Hematocrit  47 %    42-52  

 

 Mean Corpuscular Volume  90 um3    80-94  

 

 Mean Corpuscular Hemoglob  30 pg    27-31  

 

 Mean Corpuscular HGB Cone  34 g/dL    32-36  

 

 Platelet Count  230 CUMM    150-450  









 1  Desirable: <150



   Borderline High: 150-199



   High: 200-499



   Very High: >500

 

 2  Desirable: <200



   Borderline High: 200-239



   High: >239

 

 3  Low: <40



   Desirable: 40-60



   High: >60

 

 4  Desirable: <100



   Near Optimal: 100-129



   Borderline High: 130-159



   High: 160-189



   Very High: >189

 

 5  FASTING

 

 6  *******Because ethnic data is not always readily available,



   this report includes an eGFR for both -Americans and



   non- Americans.****



   The National Kidney Disease Education Program (NKDEP) does



   not endorse the use of the MDRD equation for patients that



   are not between the ages of 18 and 70, are pregnant, have



   extremes of body size, muscle mass, or nutritional status,



   or are non- or non-.



   According to the National Kidney Foundation, irrespective of



   diagnosis, the stage of the disease is based on the level of



   kidney function:



   Stage Description                      GFR(mL/min/1.73 m(2))



   1     Kidney damage with normal or decreased GFR       90



   2     Kidney damage with mild decrease in GFR          60-89



   3     Moderate decrease in GFR                         30-59



   4     Severe decrease in GFR                           15-29



   5     Kidney failure                       <15 (or dialysis)

 

 7  [id number]

 

 8  SEE RESULT BELOW



   -----------------------------------------------------------------------------
---------------



   Name:  MILTON BURTON               : 1960    Attend Dr: Parminder Oropeza MD



   Acct:  [id number]  Unit: [id number]  AGE: 57            Location:  [facility]



   Re18                        SEX: M             Status:    DEP REF



   -----------------------------------------------------------------------------
---------------



   



   SPEC: [id number]             SHAILESH: 18-          SUBM DR: Parminder Oropeza MD



   REQ:  [id number]             RECD: 



   STATUS: JULIETA FLOREZ DR: Harris Wilkerson MD



   _



   ORDERED:  LEVEL 4



   COMMENTS: [id number]



   



   FINAL DIAGNOSIS



   



   



   Duodenum, third portion, biopsy:



   -- Benign small intestinal mucosa with no significant pathologic 
abnormalities.



   -- No evidence of villous blunting or increased intraepithelial lymphocytes.



   



   



   



   CLINICAL HISTORY



   



   Iron deficiency anemia



   



   POST-OPERATIVE DIAGNOSIS



   



   EGD: larynx ? symmetric; esophagus ? normal, EG 41, a little columnar, no 
erosions; stomach



   ? normal; duodenum ? normal, biopsy x2 at third portion; conclusion/plan: 
normal EGD; iron



   deficiency anemia ? mild



   



   GROSS DESCRIPTION



   



   The specimen is received in formalin labeled, Biopsy Third Portion Duodenum, 
and consists of



   two speckled tan-white irregular to polypoid soft tissue fragments measuring 
0.4 x 0.2 x 0.1



   cm and 0.6 by up to 0.3 x 0.1 cm which are entirely submitted in one 
cassette.



   



   Signed by and Reported on: __________              Zehra Bowman MD 
05/15/18 1155



   



   -----------------------------------------------------------------------------
---------------



   



   



   



   



   



   



   



   



   ** END OF REPORT **



   



   DEPARTMENT OF PATHOLOGY,  [address] [place] [address]



   Phone # [phone]      Fax #[phone]



   Brenden Drew M.D. Director     [facility] # [id number]

 

 9  [id number]

 

 10  SEE RESULT BELOW



   -----------------------------------------------------------------------------
---------------



   Name:  MILTON BURTON               : 1960    Attend Dr: Parminder Oropeza MD



   Acct:  [id number]  Unit: [id number]  AGE: 57            Location:  [facility]



   Re18                        SEX: M             Status:    DEP REF



   -----------------------------------------------------------------------------
---------------



   



   SPEC: [id number]         SHAILESH:       [facility] DR: Parminder Oropeza MD



   REQ:  [id number]              RECD:   



   STATUS: ARCHIE FLOREZ DR: Harris Wilkerson MD



   _



   SOURCE: GAS ANTRUM     SPDES:



   ORDERED:  Clotest



   COMMENTS: [id number]



   



   -----------------------------------------------------------------------------
---------------



   Procedure                         Result                         Reported   
        Site



   -----------------------------------------------------------------------------
---------------



   Clotest  Final                                                   05/15/18-
0901      ML



   Clotest                     Negative



   



   -----------------------------------------------------------------------------
---------------



   * ML - Main Lab



   .



   



   



   



   



   



   



   



   



   



   



   



   



   



   



   



   



   



   



   



   



   



   



   



   



   



   



   ** END OF REPORT **



   



   DEPARTMENT OF PATHOLOGY,  [address] [place] [address]



   Phone # [phone]      Fax #[phone]



   Brenden Drew M.D. Director     [facility] # [id number]

 

 11  [id number]

 

 12  SEE RESULT BELOW



   -----------------------------------------------------------------------------
---------------



   Name:  MILTON BURTON               : 1960    Attend Dr: Parminder Oropeza MD



   Acct:  [id number]  Unit: [id number]  AGE: 57            Location:  [facility]



   Re18                        SEX: M             Status:    DEP REF



   -----------------------------------------------------------------------------
---------------



   



   SPEC: [id number]         SHAILESH:       SUBM DR: Parminder Oropeza MD



   REQ:  [id number]              RECD:   



   STATUS: COMP             OTHR DR: Harris Wilkerson MD



   _



   SOURCE: STOOL          SPDESC:



   ORDERED:  Occult Bl, Diag



   COMMENTS: [id number]



   



   



   -----------------------------------------------------------------------------
---------------



   Procedure                         Result                         Reported   
        Site



   -----------------------------------------------------------------------------
---------------



   Stool Occult Blood (1)  Final                                    18-
1124      ML



   Stool Occult Blood          Negative



   



   -----------------------------------------------------------------------------
---------------



   * ML - Main Lab



   .



   



   



   



   



   



   



   



   



   



   



   



   



   



   



   



   



   



   



   



   



   



   



   



   



   



   ** END OF REPORT **



   



   DEPARTMENT OF PATHOLOGY,  [address] [place] [address]



   Phone # [phone]      Fax #[phone]



   Brenden Drew M.D. Director     [facility] # [id number]

 

 13  FASTING

 

 14  Therapeutic target for the treatment of diabetes



   mellitus patients is <7% HBA1C, and in selective



   patients <6.0%.  Please refer to American Diabetes



   Association diabetic care guidelines for further



   information.

 

 15  FASTING

 

 16  FASTING

 

 17  FASTING

 

 18  *******Because ethnic data is not always readily available,



   this report includes an eGFR for both -Americans and



   non- Americans.****



   The National Kidney Disease Education Program (NKDEP) does



   not endorse the use of the MDRD equation for patients that



   are not between the ages of 18 and 70, are pregnant, have



   extremes of body size, muscle mass, or nutritional status,



   or are non- or non-.



   According to the National Kidney Foundation, irrespective of



   diagnosis, the stage of the disease is based on the level of



   kidney function:



   Stage Description                      GFR(mL/min/1.73 m(2))



   1     Kidney damage with normal or decreased GFR       90



   2     Kidney damage with mild decrease in GFR          60-89



   3     Moderate decrease in GFR                         30-59



   4     Severe decrease in GFR                           15-29



   5     Kidney failure                       <15 (or dialysis)

 

 19  SEE RESULT BELOW



   -----------------------------------------------------------------------------
---------------



   Name:  BURTONMILTON               : 1960    Attend Dr: Parminder Oropeza MD



   Acct:  [id number]  Unit: [id number]  AGE: 56            Location:  [facility]



   Re/10/17                        SEX: M             Status:    REG REF



   -----------------------------------------------------------------------------
---------------



   



   SPEC: [id number]         SHAILESH:   08/10/    [facility] DR: Parminder Oropeza MD



   REQ:  [id number]              RECD:   08/10/



   STATUS: ARCHIE FLOREZ DR: Harris Wilkerson MD



   _



   SOURCE: GAS ANTRUM     [facility]:



   ORDERED:  Clotest



   



   -----------------------------------------------------------------------------
---------------



   Procedure                         Result                         Reported   
        Site



   -----------------------------------------------------------------------------
---------------



   Clotest  Final                                                   17-
732      ML



   Clotest                     Negative



   



   -----------------------------------------------------------------------------
---------------



   * ML - MAIN LAB ([facility])



   .



   



   



   



   



   



   



   



   



   



   



   



   



   



   



   



   



   



   



   



   



   



   



   



   



   



   



   



   ** END OF REPORT **



   



   * ML=Testing performed at Main Lab



   DEPARTMENT OF PATHOLOGY,  [address] [place] [address]



   Phone # [phone]      Fax #[phone]



   Brenden Drew M.D. Director     [facility] # [id number]

 

 20  FASTING

 

 21  FASTING

 

 22  Acute inflammation:  >10.00

 

 23  Desirable <150



   Borderline high 150-199



   High 200-499



   Very High >500

 

 24  Desirable <200



   Borderline high 200-239



   High >239

 

 25  Low <40



   Desirable: 40-60



   High: >60

 

 26  Desirable: <100 mg/dL



   Near Optimal: 100-129 mg/dL



   Borderline High: 130-159 mg/dL



   High: 160-189 mg/dL



   Very High: >189 mg/dL

 

 27  Normal Range 180 to 914



   Indeterminate Range 145 to 180



   Deficient Range  <145

 

 28  -------------------REFERENCE VALUE--------------------------



   <4.0 (Negative)

 

 29  -------------------REFERENCE VALUE--------------------------



   <6.0 (Negative)



   Test Performed by:



   [facility]



   [address] [place] [address]

 

 30  **Please note:



   The following may produce a false positive D Dimer test:



   - Rheumatoid factor greater than 60 IU/ml



   - Plasma hemoglobin greater than 0.05 gm/dl



   - Bilirubin greater than 50 mg/dl



   - Lipids greater than 1000 mg/dl



   - FDP greater than 20 ug/ml

 

 31  Specimen hemolyzed. Result may not be valid.



   [facility] Severe Sepsis and Septic Shock Management Bundle Measure



   requires all lactic acids initially measuring >2.0 mmol/L be



   repeated.

 

 32  *******Because ethnic data is not always readily available,



   this report includes an eGFR for both -Americans and



   non- Americans.****



   The National Kidney Disease Education Program (NKDEP) does



   not endorse the use of the MDRD equation for patients that



   are not between the ages of 18 and 70, are pregnant, have



   extremes of body size, muscle mass, or nutritional status,



   or are non- or non-.



   According to the National Kidney Foundation, irrespective of



   diagnosis, the stage of the disease is based on the level of



   kidney function:



   Stage Description                      GFR(mL/min/1.73 m(2))



   1     Kidney damage with normal or decreased GFR       90



   2     Kidney damage with mild decrease in GFR          60-89



   3     Moderate decrease in GFR                         30-59



   4     Severe decrease in GFR                           15-29



   5     Kidney failure                       <15 (or dialysis)

 

 33  Acute inflammation:  >10.00

 

 34  >100 to <200 pg/mL: likely compensated congestive heart



   failure (CHF)



   200 to 400 pg/mL: likely moderate CHF



   >400 pg/mL: likely moderate to severe CHF

 

 35  SEE RESULT BELOW



   -----------------------------------------------------------------------------
---------------



   Name:  MILTON BURTON               : 1960    Attend Dr: Emily House MD



   Acct:  [id number]  Unit: [id number]  AGE: 56            Location:  [facility]



   Re/10/17                        SEX: M             Status:    DEP ER



   -----------------------------------------------------------------------------
---------------



   



   SPEC: [id number]         SHAILESH:   07/10/    [facility] DR: Emily House MD



   REQ:  [id number]              RECD:   07/10/



   STATUS: ARCHIE FLOREZ DR: Harris Wilkerson MD



   _



   SOURCE: BLOOD,VENO     SPDES:



   ORDERED:  Blood Cult



   



   -----------------------------------------------------------------------------
---------------



   Procedure                         Result                         Reported   
        Site



   -----------------------------------------------------------------------------
---------------



   Aerobic Culture Bottle  Final                                    07/15/17-
811      ML



   No Growth Day 5



   



   Anaerobic Culture Bottle  Final                                  07/15/17-
811      ML



   No Growth Day 5



   



   -----------------------------------------------------------------------------
---------------



   * ML - MAIN LAB ([facility])



   .



   



   



   



   



   



   



   



   



   



   



   



   



   



   



   



   



   



   



   



   



   



   



   



   



   ** END OF REPORT **



   



   * ML=Testing performed at Main Lab



   DEPARTMENT OF PATHOLOGY,  [address] [place] [address]



   Phone # [phone]      Fax #[phone]



   Brenden Drew M.D. Director     [facility] # [id number]

 

 36  SEE RESULT BELOW



   -----------------------------------------------------------------------------
---------------



   Name:  MILTON BURTON               : 1960    Attend Dr: Emily House MD



   Acct:  [id number]  Unit: [id number]  AGE: 56            Location:  [facility]



   Re/10/17                        SEX: M             Status:    DEP ER



   -----------------------------------------------------------------------------
---------------



   



   SPEC: [id number]         SHAILESH:   07/10/    [facility] DR: Emily House MD



   REQ:  [id number]              RECD:   07/10/



   STATUS: ARCHIE FLOREZ DR: Harris Wilkerson MD



   _



   SOURCE: SPUTUM         SPDESC:



   ORDERED:  Sputum Cult/GS



   



   -----------------------------------------------------------------------------
---------------



   Procedure                         Result                         Reported   
        Site



   -----------------------------------------------------------------------------
---------------



   Sputum Smear  Final                                              07/10/17-
0908      ML



   4+ Neutrophils



   1+ Epithelial Cells



   



   4+ Gram Negative Bacilli



   2+ Gram Positive Cocci



   1+ Gram Positive Bacilli



   



   Sputum Culture  Final                                            17-
914      ML



   



   Organism 1                     HAEMOPHILUS INFLUENZAE



   Quantity                    3+



   Beta Lactamase              Negative



   Organism 2                     NORMAL CANDIDA



   Quantity                    1+



   



   -----------------------------------------------------------------------------
---------------



   * ML - MAIN LAB ([facility])



   .



   



   



   



   



   



   



   



   



   



   



   



   



   



   



   ** END OF REPORT **



   



   * ML=Testing performed at Main Lab



   DEPARTMENT OF PATHOLOGY,  [address] [place] [address]



   Phone # [phone]      Fax #[phone]



   Brenden Drew M.D. Director     [facility] # [id number]

 

 37  *******Because ethnic data is not always readily available,



   this report includes an eGFR for both -Americans and



   non- Americans.****



   The National Kidney Disease Education Program (NKDEP) does



   not endorse the use of the MDRD equation for patients that



   are not between the ages of 18 and 70, are pregnant, have



   extremes of body size, muscle mass, or nutritional status,



   or are non- or non-.



   According to the National Kidney Foundation, irrespective of



   diagnosis, the stage of the disease is based on the level of



   kidney function:



   Stage Description                      GFR(mL/min/1.73 m(2))



   1     Kidney damage with normal or decreased GFR       90



   2     Kidney damage with mild decrease in GFR          60-89



   3     Moderate decrease in GFR                         30-59



   4     Severe decrease in GFR                           15-29



   5     Kidney failure                       <15 (or dialysis)

 

 38  Serum levels of PSA measured using the [employer]



   DXI Hybritech immunoassay should not be interpreted as



   absolute evidence of the presence or absence of disease.



   The PSA value should be used in conjunction with other



   pertinent clinical diagnostic procedures.



   



   A PSA value in the range of 0.1 to 0.6 ng/ml is



   indeterminate if being used as an indicator of recurrent or



   residual disease.



   



   The values obtained with different assay methods or kits



   cannot be used interchangeably.

 

 39  CHOLESTEROL INTERPRETATION:



   Desirable:  Less than 200 MG/DL



   Borderline-High Risk:  200-239 MG/DL



   High-Risk:  240 MG/DL and over

 

 40  HDL INTERPRETATION:



   Undesirable: High Risk:  Less than 40 MG/DL



   Desirable:  Low Risk:  Greater than 60 MG/DL

 

 41  LDL INTERPRETATION:



   Low Risk Optimal Level:  LDL Less than 100 MG/DL



   Near or Above Optimal:  -129 MG/DL



   Borderline High Risk:  -159 MG/DL



   High Risk:  -189 MG/DL



   Very High Risk:  LDL Greater than 189 MG/DL

 

 42  Anion gap measurement may be of limited value in the



   presence of any alkalosis, especially in a combined acid



   base disorder.



   .

 

 43  ** Note change in reference range as of 08.  The



   change was based on recommendations from the American



   Diabetes Association.

 

 44  Please note change in reference range effective 08



   



   



   .

 

 45  *******Because ethnic data is not always readily available,



   this report includes an eGFR for both -Americans and



   non- Americans.****



   The National Kidney Disease Education Program (NKDEP) does



   not endorse the use of the MDRD equation for patients that



   are not between the ages of 18 and 70, are pregnant, have



   extremes of body size, muscle mass, or nutritional status,



   or are non- or non-.



   According to the National Kidney Foundation, irrespective of



   diagnosis, the stage of the disease is based on the level of



   kidney function:



   Stage Description                      GFR(mL/min/1.73 m(2))



   1     Kidney damage with normal or decreased GFR       90



   2     Kidney damage with mild decrease in GFR          60-89



   3     Moderate decrease in GFR                         30-59



   4     Severe decrease in GFR                           15-29



   5     Kidney failure                       <15 (or dialysis)

 

 46  FASTING

 

 47  Less Than 1.0......Low Risk of Cardiovascular Disease



   1.0-3.0............Medium Risk (<2 Fold Increase)



   Greater Than 3.0...High Risk (Approximately 2-Fold Increase)



   



   The above guidelines are referenced in "Markers of



   Inflammation and Cardiovascular Disease: Application to



   Clinical and Public Health Practice." A Statement for Health



   Professionals from the Centers for Disease Control and



   Prevention and the American Heart Association.



   (Reference: Circulation 2003 107:499-511)



   



   SERUM LEVELS OF HIGH SENSITIVITY C-REACTIVE PROTEIN AS



   MEASURED BY THE Neo[employer] LXi 725 SYSTEM SHOULD NOT BE



   INTERPRETTED AS ABSOLUTE EVIDENCE OF THE PRESENCE OR ABSENCE



   OF DISEASE.  A HIGH SENSITIVITY CRP VALUE SHOULD BE USED IN



   CONJUNCTION WITH OTHER PERTINENT CLINICAL AND DIAGNOSTIC



   INFORMATION.

 

 48  CHOLESTEROL INTERPRETATION:



   Desirable:  Less than 200 MG/DL



   Borderline-High Risk:  200-239 MG/DL



   High-Risk:  240 MG/DL and over

 

 49  HDL INTERPRETATION:



   Undesirable: High Risk:  Less than 40 MG/DL



   Desirable:  Low Risk:  Greater than 60 MG/DL

 

 50  LDL INTERPRETATION:



   Low Risk Optimal Level:  LDL Less than 100 MG/DL



   Near or Above Optimal:  -129 MG/DL



   Borderline High Risk:  -159 MG/DL



   High Risk:  -189 MG/DL



   Very High Risk:  LDL Greater than 189 MG/DL

 

 51  Anion gap measurement may be of limited value in the



   presence of any alkalosis, especially in a combined acid



   base disorder.



   .

 

 52  ** Note change in reference range as of 08.  The



   change was based on recommendations from the American



   Diabetes Association.

 

 53  Please note change in reference range effective 08



   



   



   .

 

 54  A metabolite of Naproxen, O-desmethylnaproxen, has been



   shown to interfere with the Jendrassik-Myra method for



   measuring total bilirubin.  Samples from patients who have



   taken Naproxen have shown spurious elevation in total



   bilirubin levels.

 

 55  *******Because ethnic data is not always readily available,



   this report includes an eGFR for both -Americans and



   non- Americans.****



   The National Kidney Disease Education Program (NKDEP) does



   not endorse the use of the MDRD equation for patients that



   are not between the ages of 18 and 70, are pregnant, have



   extremes of body size, muscle mass, or nutritional status,



   or are non- or non-.



   According to the National Kidney Foundation, irrespective of



   diagnosis, the stage of the disease is based on the level of



   kidney function:



   Stage Description                      GFR(mL/min/1.73 m(2))



   1     Kidney damage with normal or decreased GFR       90



   2     Kidney damage with mild decrease in GFR          60-89



   3     Moderate decrease in GFR                         30-59



   4     Severe decrease in GFR                           15-29



   5     Kidney failure                       <15 (or dialysis)







Procedures







 Date  Code  Description  Status

 

 2017  [id number]  Colonoscopy  Completed







Encounters







 Type  Date  Location  Provider  Dx  Diagnosis

 

 Office Visit  2019  Main Office  [facility] Wilkerson  B37.0  Candidal 
stomatitis



   12:40p    MD    









 B37.81  Candidal esophagitis









 Office Visit  2019 12:00p  Main Office  Harris LUQUE  J32.9  Chronic 
sinusitis,



       MD Rhea    unspecified









 J01.90  Acute sinusitis, unspecified

 

 Z71.6  Tobacco abuse counseling

 

 J44.9  Chronic obstructive pulmonary disease, unspecified









 Office Visit  2019 11:00a  Main Office  Harris LUQUE  J01.90  Acute 
sinusitis,



       MD Rhea    unspecified









 J44.9  Chronic obstructive pulmonary disease, unspecified

 

 Z71.6  Tobacco abuse counseling









 Office Visit  10/25/2018 11:00a  Main Office  Harris LUQUE  Z00.01  Encounter for



       MD Rhea    general adult



           medical exam w



           abnormal findings









 J44.9  Chronic obstructive pulmonary disease, unspecified

 

 Z71.6  Tobacco abuse counseling

 

 Z87.11  Personal history of peptic ulcer disease

 

 R73.01  Impaired fasting glucose

 

 Z23  Encounter for immunization









 Office Visit  2018  9:40a  Main Office  Harris LUQUE  D50.9  Iron 
deficiency



       MD Rhea    anemia, unspecified









 Z87.11  Personal history of peptic ulcer disease

 

 J44.9  Chronic obstructive pulmonary disease, unspecified

 

 R73.01  Impaired fasting glucose

 

 Z71.6  Tobacco abuse counseling









 Office Visit  2018 11:00a  Main Office  Harris ARIZA0.9  Iron 
deficiency



       MD Rhea    anemia, unspecified









 Z87.11  Personal history of peptic ulcer disease

 

 Z71.6  Tobacco abuse counseling

 

 J44.9  Chronic obstructive pulmonary disease, unspecified

 

 R73.01  Impaired fasting glucose

 

 D72.829  Elevated white blood cell count, unspecified









 Office Visit  2018 11:40a  Main Office  Harris DOWLING44.9  Chronic 
obstructive



       MD Rhea    pulmonary disease,



           unspecified









 Z71.6  Tobacco abuse counseling

 

 D72.829  Elevated white blood cell count, unspecified

 

 Z87.11  Personal history of peptic ulcer disease

 

 R73.01  Impaired fasting glucose

 

 Z82.49  Family hx of ischem heart dis and oth dis of the circ sys









 Office Visit  2017 11:40a  Main Office  Harris DOWLING44.9  Be 
obstructive



       MD Rhea    pulmonary disease,



           unspecified









 Z71.6  Tobacco abuse counseling

 

 K26.9  Duodenal ulcer, unsp as acute or chronic, w/o hemor or perf









 Office Visit  10/24/2017 11:00a  Main Office  Harris LUQUE  Z00.01  Encounter for



       MD Rhea    general adult



           medical exam w



           abnormal findings









 J44.9  Chronic obstructive pulmonary disease, unspecified

 

 K26.9  Duodenal ulcer, unsp as acute or chronic, w/o hemor or perf

 

 R63.4  Abnormal weight loss

 

 Z71.6  Tobacco abuse counseling

 

 Z23  Encounter for immunization









 Office Visit  2017 11:40a  Main Office  Harris Wilkerson,  R63.4  
Abnormal weight



       MD    loss









 D64.9  Anemia, unspecified

 

 K26.9  Duodenal ulcer, unsp as acute or chronic, w/o hemor or perf

 

 K29.30  Chronic superficial gastritis without bleeding

 

 Z71.6  Tobacco abuse counseling

 

 J44.9  Chronic obstructive pulmonary disease, unspecified









 Office Visit  2017 10:20a  Main Office  Harris Wilkerson,  R63.4  
Abnormal weight



       MD    loss









 D64.9  Anemia, unspecified









 Office Visit  2017 10:00a  Main Office  Harris Wilkerson,  R63.4  
Abnormal weight



       MD    loss









 D64.9  Anemia, unspecified

 

 J44.9  Chronic obstructive pulmonary disease, unspecified

 

 R79.9  Abnormal finding of blood chemistry, unspecified









 Office Visit  2017 11:00a  Main Office  Harris DOWLING44.1  Be Wilkerson MD    pulmonary disease w



           (acute) exacerbation









 J44.9  Chronic obstructive pulmonary disease, unspecified









 Office Visit  2017  1:00p  Main Office  Harris Wilkerson,  R63.4  
Abnormal weight



       MD    loss









 D64.9  Anemia, unspecified

 

 J44.9  Chronic obstructive pulmonary disease, unspecified

 

 R73.01  Impaired fasting glucose

 

 Z23  Encounter for immunization









 Office Visit  2017  2:40p  Main Office  Harris Wilkerson  R63.4  
Abnormal weight



       MD    loss









 R05  Cough

 

 Z71.6  Tobacco abuse counseling









 Office Visit  2010 10:00a  Main Office  Harris LUQUE  V70.0  Examination 
General



       MD Rhea    Medical Routine AT



           Health Care



           Facility









 V17.3  History Family Ischemic Heart Disease

 

 272.0  Hypercholesterolemia Pure

 

 305.1  Tobacco Use Disorder

 

 786.00  Respiratory Abnormality Unspec









 Office Visit  2010  2:40p  Main Office  Harris Wilkerson,  719.45  Pain 
Joint



       MD    Pelvic Region &



           Thigh









 726.5  Enthesopathy Of Hip Region

 

 305.1  Tobacco Use Disorder









 Office Visit  2010 11:20a  Main Office  Harris Wilkerson  719.45  Pain 
Joint



       MD    Pelvic Region &



           Thigh









 726.5  Enthesopathy Of Hip Region

 

 401.9  Hypertension Unspec

 

 305.1  Tobacco Use Disorder









 Office Visit  02/15/2010 11:00a  Main Office  Harris Wilkerson MD  784.0  
Headache









 386.11  Vertigo Benign Paroxysmal Position

 

 305.1  Tobacco Use Disorder









 Office Visit  2010  3:00p  Main Office  Harris Wilkerson MD  784.0  
Headache









 386.11  Vertigo Benign Paroxysmal Position

 

 780.52  Insomnia Unspecified

 

 305.1  Tobacco Use Disorder









 Office Visit  2010 10:20a  Main Office  Harris Wilkerson  780.79  
Malaise And



       MD    Fatigue Other









 780.52  Insomnia Unspecified

 

 784.0  Headache

 

 386.11  Vertigo Benign Paroxysmal Position

 

 305.1  Tobacco Use Disorder









 Office Visit  2009  9:00a  Main Office  Harris LUQUE  V70.0  Examination 
General



       MD Rhea    Medical Routine AT



           Health Care



           Facility









 305.1  Tobacco Use Disorder

 

 386.11  Vertigo Benign Paroxysmal Position

 

 V17.3  History Family Ischemic Heart Disease

 

 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







Plan of Treatment

Future Appointment(s):2019 11:40 am - Harris Wilkerson MD at Main 
[id number] - Harris Wilkerson MDB37.0 Candidal stomatitisNew Medication:
Fluconazole 100 mg - take 1 tablet by mouth daily for 14 days for candida 
infectionComments:Treatment options with potential risks, general precautions, 
follow up recommendations, and alternative treatment options discussed with 
patient in detail. Patient verbalized understanding.  Medications related 
potential side effects, general precautions, follow up recommendations 
discussed with patient in detail. Patient verbalized understanding.B37.81 
Candidal esophagitisNew Medication:Fluconazole 100 mg - take 1 tablet by mouth 
daily for 14 days for candida infectionComments:As above.

## 2019-01-27 NOTE — XMS REPORT
Continuity of Care Document (CCD)

 Created on:2019



Patient:Syd Burton

Sex:Male

:1960

External Reference #:[id number]





Demographics







 Address  [address] [place], [place] [address]

 

 Home Phone  [phone]

 

 Mobile Phone  [phone]

 

 Work Phone  [phone]

 

 Email Address  [email or web address]

 

 Preferred Language  en

 

 Marital Status  Declined to Specify/Unknown

 

 Yazdanism Affiliation  Unknown

 

 Race  White

 

 Ethnic Group  Declined to Specify/Unknown









Author







 Name  Harris Wilkerson MD

 

 Address  [address] [place] [address]









Care Team Providers







 Name  Role  Phone

 

 Harris Wilkerson MD FACP  Primary Care Physician  Unavailable









Payers







 Type  Date  Identification Numbers  Payment Provider  Subscriber

 

     Policy Number: [id number]  [employer]  Monica Burton









 PayID: [id number]   [id number]









 [place] [address]







Advance Directives







 Description

 

 No Information Available







Problems







 Date  Description  Provider  Status

 

 Onset: 2018  Impaired fasting glycaemia  Harris Wilkerson MD  Active

 

 Onset: 2018  Substance abuse counseling  Harris Wilkerson MD  Active

 

 Onset: 2018  H/O: peptic ulcer  Harris Wilkerson MD  Active

 

 Onset: 2017  Chronic obstructive lung disease  Harris Wilkerson MD  
Active

 

 Onset: 2009  Tobacco user  Harris Wilkerson MD  Active







Family History







 Date  Family Member(s)  Problem(s)  Comments

 

 :  (age 56  Father   due to MI  



 Years)      

 

   Father  Emphysema  Smoker

 

   Mother  Smoker  

 

 :  (age 64  Mother   due to Unknown Causes  "In her sleep"



 Years)      

 

   Mother  Arteriosclerosis  

 

   Mother  Peripheral Vascular Disease  



     (PVD)  

 

   Mother  Hypertension  

 

   First Brother  None  

 

   First Sister  None  







Social History







 Type  Date  Description  Comments

 

 Birth Sex    Unknown  

 

 Marital Status      2 boys

 

 Occupation    Manager  Property,



       owner/employed

 

 Work Status    Currently Working  

 

 Tobacco Use  Start: Unknown  current cigarette  2 PPD from age 20



     smoker  till 50. 1 PPD



       since 2009.

 

 Smoking Status  Reviewed: 10/24/17  current cigarette  2 PPD from age 20



     smoker  till 50. 1 PPD



       since 2009.

 

 ETOH Use  18 Years  Currently consumes  10-15 beer per week



     alcohol  "Average" "not



       daily"

 

 Recreational Drug Use    Denies Drug Use  

 

 Tobacco Use  Start: Unknown  Patient is a current  



     smoker, smokes every  



     day  

 

 Exercise Type/Frequency    Does not exercise  "Very active at



       work"

 

 Guns in Home    Yes, Locked Up  

 

 Currently Active    Patient is currently  



     sexually active  







Allergies, Adverse Reactions, Alerts







 Description

 

 No Known Drug Allergies







Medications







 Medication  Date  Status  Form  Strength  Qnty  SIG  Indications  Ordering



                 Provider

 

 Nasonex  /  Active  Suspension  50mcg/Act  17gm  1 intranasal  J32.9  
Iglesias



   2019          twice a day    A MD Rhea

 

                 

 

 Prednisone  /  Hx  Tablets  20mg  19tab  3 by mouth  J01.90  Iglesias



   2019 -        s  with food    A Rhea



   /          daily for 3    MD



             days and    



             then 2 daily    



             for 3 days    



             and then 1    



             daily for 4    



             days    

 

 Ferrex 150  /  Active  Capsules  150mg  60cap  take 1  D50.9  Iglesias



   2018        s  capsule by    A erika Wilkerson twice    MD



             daily for    



             iron    



             deficiency    



             anemia    

 

 Atorvastatin  /  Active  Tablets  20mg  30tab  take one  Z82.49  Iglseias



 Calcium  2018        s  tablet by    A erika Wilkerson in the    MD



             evening for    



             high    



             cholesterol    

 

 Omeprazole  08/10/  Active  Capsules DR  20mg  30cap  take 1  K26.9  Iglesias



   2017        s  capsule by    A erika Wilkerson once    MD



             daily    









 Z87.11









 Flovent HFA  07/10/2017  Active  Aerosol  220mcg/Act  24gm  inhale 1  J44.9  
Iglesias A



             puff twice a    MD Rhea



             day    

 

 Ventolin HFA  07/10/2017  Active  Aerosol  108(90Base)  8gm  inhale 2  J44.1  
Iglesias A



         mcg/Act    puffs every    MD Rhea



             4 hours as    



             needed for    



             shortness of    



             breath or    



             whee    









 J44.9









                 

 

 Cefuroxime Axetil  /  Hx  Tablets  500mg      J01.9  Unknown



   2018 -            0  



   2019              

 

 Amoxicillin/Clavu  /  Hx  Tablets  875-12    take 1  J01.9  Unknown



 lanate Potassium  2018 -      5mg    tablet by  0  



   /          mouth twice    



             a day for 7    



             days    

 

 Fluticasone  /  Hx  Suspension  50mcg/    Instill 2  J01.9  Unknown



 Propionate  2018 -      Act    Sprays Into  0  



   /          Each Nostil    



   2018          Once Daily    

 

 Wellbutrin SR  10/24/  Hx  Tablets ER 12HR  150mg  60tabs  1 by mouth  Z71.6  
Iglesias REBEL



    -          twice a day    MD Rhea



   10/24/              



   2017              

 

 Doxycycline  07/10/  Hx  Capsules  100mg    take 1  J44.1  [facility]



 Hyclate   -          capsule by    Physician



   07/15/          mouth twice    



   2017          a day    

 

 Prednisone  07/10/  Hx  Tablets  20mg    take 2  J44.1  [facility]



   2017 -          tablets by    Physician



   07/15/          mouth once    



   2017          daily    

 

 Pravastatin  /  Hx  Tablets  20mg  30tabs  1 po qd qhs  272.0  Iglesias A



 Sodium   -              MD Rhea



   2010              

 

 Meloxicam  /  Hx  Tablets  7.5mg  30tabs  1-2 po qd  726.5  Harris LUQUE



    -              MD Rhea



   2010              

 

 Wellbutrin SR  /  Hx  Tablets ER 12HR  150mg  60tabs  1 po bid  305.1  
Harris LUQUE



    -              MD Rhea



   2010              

 

 Zolpidem Tartrate  /  Hx  Tablets  10mg  5tabs  1/2-1 po qhs  780.5  
Iglesias A



    -          prn  2  MD Rhea



   2010              

 

 Chantix  /  Hx  Tablets  1mg  60tabs  days 1-3 1/  305.1  Harris LUQUE



    -          once daily    MD Rhea



   2010           days 4-7    



             1/2 twice    



             daily    



                     day    



             8 onwards 1    



             twice daily    







Immunizations







 CPT Code  Status  Date  Vaccine  Lot #

 

 [id number]  Given  10/25/2018  Influenza Vaccine Quadrivalent  [id number]



       Preser/Antibiotic Free Im Use  

 

 64933  Given  10/24/2017  Influenza Vaccine Quadrivalent  [id number]



       Preser/Antibiotic Free Im Use  

 

 89177  Given  2017  Pneumococcal Vaccine 2Yrs Or Older  [id number]

 

 21406  Given  2009  Tdap-Tetanus, Diphtheria  TDAP [id number] (2)



       Toxoids/Acellular Pertussis Vaccine 7+  







Vital Signs







 Date  Vital  Result  Comment

 

 2019 11:58am  Body Temperature  98.3 F  

 

 2019 10:43am  Body Temperature  97.8 F  









 Heart Rate  70 /min  

 

 BP Systolic  125 mmHg  

 

 BP Diastolic  75 mmHg  









 10/25/2018 11:56am  Heart Rate  64 /min  









 BP Systolic  140 mmHg  

 

 BP Diastolic  80 mmHg  

 

 BP Systolic Recheck  125 mmHg  

 

 BP Diastolic Recheck  75 mmHg  

 

 BMI (Body Mass Index)  23.6 kg/m2  

 

 Weight  144.00 lb  

 

 Height  65.5 inches  5'5.50"









 2018  9:57am  Heart Rate  60 /min  









 BP Systolic  110 mmHg  

 

 BP Diastolic  80 mmHg  

 

 Weight  145.00 lb  









 2018 11:24am  Heart Rate  66 /min  









 BP Systolic  125 mmHg  

 

 BP Diastolic  80 mmHg  

 

 Weight  143.00 lb  









 2018 11:53am  Heart Rate  72 /min  









 BP Systolic  135 mmHg  

 

 BP Diastolic  80 mmHg  

 

 Weight  144.00 lb  









 2017  2:07pm  Heart Rate  76 /min  









 BP Systolic  125 mmHg  

 

 BP Diastolic  80 mmHg  









 2017 11:53am  Weight  138.00 lb  

 

 10/24/2017 10:49am  Body Temperature  97.8 F  









 O2 % BldC Oximetry  96 %  

 

 Heart Rate  77 /min  

 

 BP Systolic  130 mmHg  

 

 BP Diastolic  80 mmHg  

 

 BMI (Body Mass Index)  22.6 kg/m2  

 

 Weight  138.00 lb  

 

 Height  65.50 inches  5'5.50"









 2017 12:07pm  Weight  133.00 lb  

 

 2017 11:12am  Weight  129.00 lb  

 

 2017 10:03am  Heart Rate  60 /min  









 BP Systolic  125 mmHg  

 

 BP Diastolic  80 mmHg  

 

 Weight  129.00 lb  









 2017 10:58am  Weight  129.00 lb  

 

 2017 11:18am  Weight  126.00 lb  

 

 2017 10:43am  Weight  134.00 lb  

 

 2017  1:11pm  Weight  128.00 lb  

 

 2017  2:44pm  Body Temperature  98.2 F  









 O2 % BldC Oximetry  95 %  

 

 Heart Rate  78 /min  

 

 BP Systolic  128 mmHg  

 

 BP Diastolic  80 mmHg  

 

 Weight  134.00 lb  









 2010 10:09am  Heart Rate  76 /min  









 BP Systolic  125 mmHg  

 

 BP Diastolic  85 mmHg  

 

 BMI (Body Mass Index)  26.5 kg/m2  

 

 Weight  164.00 lb  

 

 Height  66 inches  5'6"









 2010  5:31pm  Heart Rate  66 /min  









 BP Systolic  135 mmHg  

 

 BP Diastolic  85 mmHg  









 2010 12:48pm  Heart Rate  80 /min  









 BP Systolic  145 mmHg  

 

 BP Diastolic  85 mmHg  









 2010 10:31am  Body Temperature  98.0 F  









 Heart Rate  64 /min  

 

 BP Systolic  130 mmHg  Supine and standing

 

 BP Diastolic  80 mmHg  Supine and standing

 

 BMI (Body Mass Index)  27.1 kg/m2  

 

 Weight  168.00 lb  with shoes

 

 Height  66 inches  5'6"









 2009  9:13am  Body Temperature  98.5 F  









 O2 % BldC Oximetry  95 %  

 

 Heart Rate  71 /min  

 

 BP Systolic  135 mmHg  

 

 BP Diastolic  90 mmHg  

 

 BP Systolic Recheck  135 mmHg  

 

 BP Diastolic Recheck  90 mmHg  

 

 BMI (Body Mass Index)  26.8 kg/m2  

 

 Weight  166.00 lb  

 

 Height  66 inches  5'6"







Results







 Test  Date  Facility  Test  Result  H/L  Range  Note

 

 Urinalysis Profile  10/11/2018  [facility]  Urine Color  Yellow    
  



     [place] [address]          



     [phone]          









 Urine Appearance  Clear      

 

 Urine Specific Gravity  1.016  N  1.010-1.030  

 

 Urine pH  5.0  N  5-9  

 

 Urine Urobilinogen  Negative    Negative  

 

 Urine Ketones  Negative    Negative  

 

 Urine Protein  Negative    Negative  

 

 Urine Leukocytes  Negative    Negative  

 

 Urine Blood  Negative    Negative  

 

 Urine Nitrite  Negative    Negative  

 

 Urine Bilirubin  Negative    Negative  

 

 Urine Glucose  Negative    Negative  









 Lipid Profile  10/11/2018  [facility]  Triglycerides  63 mg/dL    <
150  1



 (Trig/Chol/[place] [address]          



     [phone]          









 Cholesterol  149 mg/dL    <200  2

 

 HDL Cholesterol  53.8 mg/dL    >40  3

 

 LDL Cholesterol  83 mg/dL    <100  4









 CBC No Diff  10/11/2018  [facility]  White Blood  9.1 10^3/uL  N  
3.5-10.8  



     [place] [address]  Count        



     [phone]          









 Red Blood Count  4.71 10^6/uL  N  4.00-5.40  

 

 Hemoglobin  14.4 g/dL  N  14.0-18.0  

 

 Hematocrit  42 %  N  42-52  

 

 Mean Corpuscular Volume  88 fL  N  80-94  

 

 Mean Corpuscular Hemoglobin  31 pg  N  27-31  

 

 Mean Corpuscular HGB Conc  35 g/dL  N  31-36  

 

 Red Cell Distribution Width  14 %  N  10.5-15  

 

 Platelet Count  212 10^3/uL  N  150-450  

 

 Mean Platelet Volume  8.0 um3  N  7.4-10.4  









 Laboratory test  10/11/2018  [facility]  Ferritin  66.5 ng/mL  N  24
-336  5



 [place] [address]          



     [phone]          

 

 Iron & Iron Binding  10/11/2018  [facility]  Iron  85 g/dL  N  50-
212  



 [place] [address]          



     [phone]          









 Unsaturated Iron Binding  215 g/dL      

 

 Total Iron Binding Capacity  300 g/dL  N  250-450  

 

 Transferrin  214 mg/dL  N  203-362  

 

 % Iron Saturation  28 %  N  15-55  









 Comp Metabolic Panel  10/11/2018  [facility]  Sodium  141 mmol/L  N
  135-145  



     [place], [place] [id number] [phone]          









 Potassium  4.8 mmol/L  N  3.5-5.0  

 

 Chloride  104 mmol/L  N  101-111  

 

 Co2 Carbon Dioxide  32 mmol/L  N  22-32  

 

 Anion Gap  5 mmol/L  N  2-11  

 

 Glucose  82 mg/dL  N    

 

 Blood Urea Nitrogen  16 mg/dL  N  6-24  

 

 Creatinine  0.92 mg/dL  N  0.67-1.17  

 

 BUN/Creatinine Ratio  17.4  N  8-20  

 

 Calcium  9.1 mg/dL  N  8.6-10.3  

 

 Total Protein  5.7 g/dL  Low  6.4-8.9  

 

 Albumin  4.1 g/dL  N  3.2-5.2  

 

 Globulin  1.6 g/dL  Low  2-4  

 

 Albumin/Globulin Ratio  2.6  N  1-3  

 

 Total Bilirubin  0.60 mg/dL  N  0.2-1.0  

 

 Alkaline Phosphatase  61 U/L  N    

 

 Alt  16 U/L  N  7-52  

 

 Ast  23 U/L  N  13-39  

 

 Egfr Non-  85.4    >60  

 

 Egfr   103.4    >60  6









 Hemoglobin/Hematacrit  2018  [facility]  Hemoglobin  14.2  N  
14.0-18.0  



     [place] [address]    g/dL      



     [phone]          









 Hematocrit  41 %  Low  42-52  









 Laboratory test  2018  [facility]  Iron  42 g/dL  Low  50-
212  



 [place] [address]          



     [phone]          

 

 Iron & Iron  2018  [facility]  Unsaturated Iron  284 g/dL  
    



 [place] [address]  Binding        



 [phone]          









 Total Iron Binding Capacity  326 g/dL  N  250-450  

 

 Transferrin  233 mg/dL  N  203-362  

 

 % Iron Saturation  13 %  Low  15-55  









 Hemoglobin/Hematacrit  2018  [facility]  Hemoglobin  13.7  
Low  14.0-18.0  



     [place] [address]    g/dL      



     [phone]          









 Hematocrit  40 %  Low  42-52  









 Laboratory test  2018  [facility]  Iron  71 g/dL  N  
  



 [place] [address]          



     [phone]          

 

 Iron & Iron  2018  [facility]  Unsaturated Iron  261 g/dL  
    



 [place] [address]  Binding        



     [phone]          









 Total Iron Binding Capacity  332 g/dL  N  250-450  

 

 Transferrin  237 mg/dL  N  203-362  

 

 % Iron Saturation  21 %  N  15-55  









 Laboratory test  2018  [facility]  Surgical  SEE RESULT      7
, 8



 [place] [address]  Pathology  BELOW      



     [phone]          

 

 Laboratory test  2018  [facility]  Clotest  SEE RESULT      9
, 10



 [place] [address]    BELOW      



     [phone]          

 

 Stool Occult  2018  [facility]  Stool Occult  SEE RESULT      
11, 12



 [place] [address]  Blood, Diag  BELOW      



     [phone]          

 

 CBC Auto Diff  2018  [facility]  White Blood  9.0 10^3/uL  N  
3.5-1  



     [place] [address]  Count      0.8  



     [phone]          









 Red Blood Count  4.42 10^6/uL  N  4.0-5.4  

 

 Hemoglobin  13.5 g/dL  Low  14.0-18.0  

 

 Hematocrit  39 %  Low  42-52  

 

 Mean Corpuscular Volume  89 fL  N  80-94  

 

 Mean Corpuscular Hemoglobin  31 pg  N  27-31  

 

 Mean Corpuscular HGB Conc  35 g/dL  N  31-36  

 

 Red Cell Distribution Width  14 %  N  10.5-15  

 

 Platelet Count  218 10^3/uL  N  150-450  

 

 Mean Platelet Volume  8.6 um3  N  7.4-10.4  

 

 Abs Neutrophils  4.9 10^3/uL  N  1.5-7.7  

 

 Abs Lymphocytes  3.0 10^3/uL  N  1.0-4.8  

 

 Abs Monocytes  0.7 10^3/uL  N  0-0.8  

 

 Abs Eosinophils  0.3 10^3/uL  N  0-0.6  

 

 Abs Basophils  0 10^3/uL  N  0-0.2  

 

 Abs Nucleated RBC  0 10^3/uL      

 

 Granulocyte %  54.7 %  N  38-83  

 

 Lymphocyte %  33.6 %  N  25-47  

 

 Monocyte %  8.2 %  High  0-7  

 

 Eosinophil %  3.0 %  N  0-6  

 

 Basophil %  0.5 %  N  0-2  

 

 Nucleated Red Blood Cells %  0.1      









 Laboratory test  2018  [facility]  Glucose  101 mg/dL  High  
  13



 [place] [address] [phone]          









 Hemoglobin A1c (Glyco HGB)  5.3 %  N  4.0-5.6  14









 Laboratory test finding  2018  [facility]  Alt  18 U/L  N  7-
52  15



     [place] [address] [phone]          









 Creatine Kinase(CK)  155 U/L  N    16









 Laboratory test  2017  [facility]  Ferritin  123.7 ng/mL  N  
  17



 [place] [address] [phone]          

 

 Iron & Iron Binding  2017  [facility]  Iron  103 g/dL  N  50
-212  



 [place] [address] [phone]          









 Unsaturated Iron Binding  247 g/dL      

 

 Total Iron Binding Capacity  350 g/dL  N  250-450  

 

 % Iron Saturation  29 %  N  15-55  









 Comp Metabolic Panel  2017  [facility]  Sodium  137 mmol/L  N
  133-145  



     [place] [address] [phone]          









 Potassium  4.1 mmol/L  N  3.5-5.0  

 

 Chloride  101 mmol/L  N  101-111  

 

 Co2 Carbon Dioxide  32 mmol/L  N  22-32  

 

 Anion Gap  4 mmol/L  N  2-11  

 

 Glucose  98 mg/dL  N    

 

 Blood Urea Nitrogen  13 mg/dL  N  6-24  

 

 Creatinine  0.89 mg/dL  N  0.67-1.17  

 

 BUN/Creatinine Ratio  14.6  N  8-20  

 

 Calcium  9.3 mg/dL  N  8.6-10.3  

 

 Total Protein  6.7 g/dL  N  6.4-8.9  

 

 Albumin  4.5 g/dL  N  3.2-5.2  

 

 Globulin  2.2 g/dL  N  2-4  

 

 Albumin/Globulin Ratio  2.0  N  1-3  

 

 Total Bilirubin  0.70 mg/dL  N  0.2-1.0  

 

 Alkaline Phosphatase  54 U/L  N    

 

 Alt  18 U/L  N  7-52  

 

 Ast  18 U/L  N  13-39  

 

 Egfr Non-  89.1    >60  

 

 Egfr   114.6    >60  18









 CBC No Diff  2017  [facility]  White Blood  13.6 10^3/uL  
High  3.5-10.8  



     [place] [address]  Count        



     [phone]          









 Red Blood Count  5.28 10^6/uL  N  4.0-5.4  

 

 Hemoglobin  15.8 g/dL  N  14.0-18.0  

 

 Hematocrit  47 %  N  42-52  

 

 Mean Corpuscular Volume  89 fL  N  80-94  

 

 Mean Corpuscular Hemoglobin  30 pg  N  27-31  

 

 Mean Corpuscular HGB Conc  34 g/dL  N  31-36  

 

 Red Cell Distribution Width  14 %  N  10.5-15  

 

 Platelet Count  263 10^3/uL  N  150-450  

 

 Mean Platelet Volume  8 um3  N  7.4-10.4  









 Laboratory test  08/10/2017  [facility]  Clotest  SEE RESULT      19



 [place] [address]    BELOW      



     [phone]          

 

 Laboratory test  2017  [facility]  Creatine  129 U/L  N  10-
223  20



 [place] [address]  Kinase(CK)        



     [phone]          









 Erythrocyte Sed Rate  6 mm/Hr  N  0-20  21

 

 C Reactive Protein  1.27 mg/L  N  < 5.00  22









 Lipid Profile  2017  [facility]  Triglycerides  59 mg/dL  N  <
150  23



 (Trig/Chol/[place] [address]          



     [phone]          









 Cholesterol  147 mg/dL  N  <200  24

 

 HDL Cholesterol  60.9 mg/dL  N  >40  25

 

 LDL Cholesterol  74 mg/dL  N  <100  26









 CBC W/Diff  2017  [facility]  White Blood Count  10.2 
     









 RBC Red Blood Count  3.97  Low  4.60-6.20 x10 6L  

 

 Hemoglobin  11.6  Low  14-18 G/DL  

 

 Hematocrit  36.7  Low  42-52 %  

 

 MCV (Corpuscular Volume)  92.3      

 

 MCH (Corpuscular Hemoglobin)  29.2      

 

 MCHC (Corpuscular Hemog Conc)  31.6  Low  32.0-36.0 G/DL  

 

 RDW  15.5  High  10.5-15.0 %  

 

 Platelet Count  228      

 

 MPV  6.7      









 CBC Auto Diff  2017  [facility]  White Blood  9.0 10^3/uL  N  
3.5-10.8  



     [place] [address]  Count        



     [phone]          









 Red Blood Count  4.54 10^6/uL  N  4.0-5.4  

 

 Hemoglobin  13.7 g/dL  Low  14.0-18.0  

 

 Hematocrit  41 %  Low  42-52  

 

 Mean Corpuscular Volume  90 fL  N  80-94  

 

 Mean Corpuscular Hemoglobin  30 pg  N  27-31  

 

 Mean Corpuscular HGB Conc  34 g/dL  N  31-36  

 

 Red Cell Distribution Width  13 %  N  10.5-15  

 

 Platelet Count  259 10^3/uL  N  150-450  

 

 Mean Platelet Volume  8 um3  N  7.4-10.4  

 

 Abs Neutrophils  7.9 10^3/uL  High  1.5-7.7  

 

 Abs Lymphocytes  0.8 10^3/uL  Low  1.0-4.8  

 

 Abs Monocytes  0.3 10^3/uL  N  0-0.8  

 

 Abs Eosinophils  0 10^3/uL  N  0-0.6  

 

 Abs Basophils  0 10^3/uL  N  0-0.2  

 

 Abs Nucleated RBC  0 10^3/uL  N    

 

 Granulocyte %  88.1 %  High  38-83  

 

 Lymphocyte %  8.7 %  Low  25-47  

 

 Monocyte %  3.0 %  N  1-9  

 

 Eosinophil %  0 %  N  0-6  

 

 Basophil %  0.2 %  N  0-2  

 

 Nucleated Red Blood Cells %  0  N    









 Laboratory test  2017  [facility]  Lipase  32 U/L  N  11.0-
82.0  



 [place] [address]          



     [phone]          

 

 Iron & Iron Binding  2017  [facility]  Iron  44 g/dL  Low  
  



 [place] [address]          



     [phone]          









 Unsaturated Iron Binding  226 g/dL  N    

 

 Total Iron Binding Capacity  270 g/dL  N  250-450  

 

 % Iron Saturation  16 %  N  15-55  









 Laboratory test  2017  [facility]  Ferritin  217.0 ng/mL  N  
  



 [place] [address]          



     [phone]          









 Vitamin B12  792 pg/mL  N  180-914  27









 Transglutaminase Igg  2017  [facility]  Tissue  <1.2  N    28



 [place] [address]  Transglutaminase IgA  U/mL      



     [phone]  Ab        









 Tissue Transglutaminase IgG Ab  <1.2 U/mL  N    29









 Laboratory test  07/10/2017  [facility]  Inr/Protime  1.01  N  0.89-
1.11  



 finding    [place], [place] [id number] [phone]          









 Activated Partial Thrombo Time  37.0 seconds  High  26.0-36.3  

 

 D Dimer Quantitative  382 ng/mL  High  Less Than 230  30

 

 Lactic Acid  1.6 mmol/L  N  0.5-2.0  31









 CBC Auto  07/10/2017  [facility]  White Blood  17.4 10^3/uL  High  
3.5-10.8  



 [place] [address]  Count        



     [phone]          









 Red Blood Count  5.72 10^6/uL  High  4.0-5.4  

 

 Hemoglobin  17.2 g/dL  N  14.0-18.0  

 

 Hematocrit  51 %  N  42-52  

 

 Mean Corpuscular Volume  90 fL  N  80-94  

 

 Mean Corpuscular Hemoglobin  30 pg  N  27-31  

 

 Mean Corpuscular HGB Conc  33 g/dL  N  31-36  

 

 Red Cell Distribution Width  13 %  N  10.5-15  

 

 Platelet Count  194 10^3/uL  N  150-450  

 

 Mean Platelet Volume  9 um3  N  7.4-10.4  

 

 Abs Neutrophils  15.5 10^3/uL  High  1.5-7.7  

 

 Abs Lymphocytes  0.8 10^3/uL  Low  1.0-4.8  

 

 Abs Monocytes  1.0 10^3/uL  High  0-0.8  

 

 Abs Eosinophils  0 10^3/uL  N  0-0.6  

 

 Abs Basophils  0.1 10^3/uL  N  0-0.2  

 

 Abs Nucleated RBC  0 10^3/uL  N    

 

 Granulocyte %  89.0 %  High  38-83  

 

 Lymphocyte %  4.6 %  Low  25-47  

 

 Monocyte %  6.0 %  N  1-9  

 

 Eosinophil %  0 %  N  0-6  

 

 Basophil %  0.4 %  N  0-2  

 

 Nucleated Red Blood Cells %  0  N    









 Comp Metabolic Panel  07/10/2017  [facility]  Sodium  135 mmol/L  N
  133-145  



     [place], [place] [id number] [phone]          









 Potassium  4.0 mmol/L  N  3.5-5.0  

 

 Chloride  98 mmol/L  Low  101-111  

 

 Co2 Carbon Dioxide  29 mmol/L  N  22-32  

 

 Anion Gap  8 mmol/L  N  2-11  

 

 Glucose  139 mg/dL  High    

 

 Blood Urea Nitrogen  13 mg/dL  N  6-24  

 

 Creatinine  0.78 mg/dL  N  0.67-1.17  

 

 BUN/Creatinine Ratio  16.7  N  8-20  

 

 Calcium  9.5 mg/dL  N  8.6-10.3  

 

 Total Protein  7.4 g/dL  N  6.4-8.9  

 

 Albumin  4.1 g/dL  N  3.2-5.2  

 

 Globulin  3.3 g/dL  N  2-4  

 

 Albumin/Globulin Ratio  1.2  N  1-3  

 

 Total Bilirubin  1.20 mg/dL  High  0.2-1.0  

 

 Alkaline Phosphatase  80 U/L  N    

 

 Alt  17 U/L  N  7-52  

 

 Ast  26 U/L  N  13-39  

 

 Egfr Non-  103.7  N  >60  

 

 Egfr   133.4  N  >60  32









 Laboratory test  07/10/2017  [facility]  Creatine  237 U/L  High  10
-223  



 [place] [address]  Kinase(CK)        



     [phone]          









 C Reactive Protein  122.79 mg/L  High  < 5.00  33

 

 Troponin I  0.01 ng/mL  N  <0.04  









 CKMB  07/10/2017  [facility]  CKMB  ng/mL  9.8 ng/mL  High  0.6-6.3
  



     [place] [address]          



     [phone]          

 

 Laboratory test  07/10/2017  [facility]  B Type  96 pg/mL  N    34



 [place] [address]  Natriuretic        



     [phone]  Peptide        









 Blood Culture  SEE RESULT BELOW      35









 Laboratory test  07/10/2017  [facility] [facility]  Sputum  SEE RESULT      36



 [place] [address]  Culture &  BELOW      



     [phone]  Gram Stain        

 

 Xray  2017  [facility]  Chest, 2  COPD      



     [address]  Views        



     [place], [place] [id number] [phone]          

 

 CBC Auto Diff  2017  [facility]  White Blood  10.2 10^3/uL  N
  3.5-10  



     [place] [address]  Count      .8  



     [phone]          









 Red Blood Count  4.38 10^6/uL  N  4.0-5.4  

 

 Hemoglobin  13.1 g/dL  Low  14.0-18.0  

 

 Hematocrit  39 %  Low  42-52  

 

 Mean Corpuscular Volume  89 fL  N  80-94  

 

 Mean Corpuscular Hemoglobin  30 pg  N  27-31  

 

 Mean Corpuscular HGB Conc  34 g/dL  N  31-36  

 

 Red Cell Distribution Width  13 %  N  10.5-15  

 

 Platelet Count  234 10^3/uL  N  150-450  

 

 Mean Platelet Volume  9 um3  N  7.4-10.4  

 

 Abs Neutrophils  6.5 10^3/uL  N  1.5-7.7  

 

 Abs Lymphocytes  2.2 10^3/uL  N  1.0-4.8  

 

 Abs Monocytes  1.1 10^3/uL  High  0-0.8  

 

 Abs Eosinophils  0.3 10^3/uL  N  0-0.6  

 

 Abs Basophils  0.1 10^3/uL  N  0-0.2  

 

 Abs Nucleated RBC  0 10^3/uL  N    

 

 Granulocyte %  64.0 %  N  38-83  

 

 Lymphocyte %  21.9 %  Low  25-47  

 

 Monocyte %  10.6 %  High  1-9  

 

 Eosinophil %  2.8 %  N  0-6  

 

 Basophil %  0.7 %  N  0-2  

 

 Nucleated Red Blood Cells %  0  N    









 Laboratory test  2017  [facility]  TSH (Thyroid  1.36 mcIU/mL
  N  0.34-5.60  



 finding    [place], [place] [id number] [facility]) [phone]          









 Free T4 (Free Thyroxine)  1.05 ng/dL  N  0.61-1.12  

 

 T3 Free  3.50 pg/mL  N  2.5-3.9  









 Comp Metabolic Panel  2017  [facility]  Sodium  138 mmol/L  N
  133-145  



     [place] [address] [phone]          









 Potassium  3.9 mmol/L  N  3.5-5.0  

 

 Chloride  97 mmol/L  Low  101-111  

 

 Co2 Carbon Dioxide  35 mmol/L  High  22-32  

 

 Anion Gap  6 mmol/L  N  2-11  

 

 Glucose  105 mg/dL  High    

 

 Blood Urea Nitrogen  10 mg/dL  N  6-24  

 

 Creatinine  0.78 mg/dL  N  0.67-1.17  

 

 BUN/Creatinine Ratio  12.8  N  8-20  

 

 Calcium  8.9 mg/dL  N  8.6-10.3  

 

 Total Protein  5.9 g/dL  Low  6.4-8.9  

 

 Albumin  3.7 g/dL  N  3.2-5.2  

 

 Globulin  2.2 g/dL  N  2-4  

 

 Albumin/Globulin Ratio  1.7  N  1-3  

 

 Total Bilirubin  0.60 mg/dL  N  0.2-1.0  

 

 Alkaline Phosphatase  63 U/L  N    

 

 Alt  22 U/L  N  7-52  

 

 Ast  20 U/L  N  13-39  

 

 Egfr Non-  103.7  N  >60  

 

 Egfr   133.4  N  >60  37









 Laboratory test  2017  [facility]  PSA Diagnostic  0.144  N  0
-4.000  38



 [place] [address]    ng/mL      



     [phone]          

 

 Urinalysis  2017  [facility]  Urine Color  Straw  N    



 [place] [address]          



     [phone]          









 Urine Appearance  Clear  N    

 

 Urine Specific Gravity  1.004  Low  1.010-1.030  

 

 Urine pH  8.0  N  5-9  

 

 Urine Urobilinogen  Negative  N  Negative  

 

 Urine Ketones  Negative  N  Negative  

 

 Urine Protein  Negative  N  Negative  

 

 Urine Leukocytes  Negative  N  Negative  

 

 Urine Blood  Negative  N  Negative  

 

 Urine Nitrite  Negative  N  Negative  

 

 Urine Bilirubin  Negative  N  Negative  

 

 Urine Glucose  Negative  N  Negative  









 Lipid Profile  2010  [facility]  Triglyceride  103 mg/dL    40
-200  



 (Trig/[place] [address]          



     [phone]          









 Cholesterol  202 mg/dL  High  Less Than 200  39

 

 High Density Lipoprotein  54 mg/dL    40-60  40

 

 Cholesterol/HDL Ratio  3.74 AVERAGE    1-4.97  

 

 Low Density Lipoprotein  127 mg/dL  High  Less Than 100  41









 Xray  2010  [facility]  Hip, Complete,  Mild OA      



     [address]  Min. Of 2 Views,        



     [place] [address]  LT        



     [phone]          

 

 Xray  2010  [facility]  CT, Head Or  Normal      



     101 DATES   Brain; Without        



     [place] [address]  Contrast        



     [phone]          

 

 Laboratory test  2010  [facility]  CPK (Creatine  127    0-
200  



 [place] [address]  Kinase)        



     [phone]          









 Erythrocyte Sed Rate  1 MM/HR    0-15  

 

 TSH  1.47 MIU/ML    0.34-5.60  

 

 CPK (Creatine Kinase)  127 U/L    0-200  









 Basic Metabolic Panel  2010  [facility]  Sodium  142 mmol/L  
  135-145  



     [place] [address]          



     [phone]          









 Potassium  5.8 mmol/L  High  3.5-5.0  

 

 Chloride  104 mmol/L    101-111  

 

 Co2 (Carbon Dioxide)  31.0 mmol/L    22-32  

 

 Anion Gap  7.0 mmol/L    2-11  42

 

 Glucose  85 mg/dL      43

 

 BUN  8 mg/dL    6-24  

 

 Creatinine  1.10 mg/dL    0.50-1.40  

 

 One Over Creatinine  0.90      

 

 BUN/Creatinine Ratio  7.3  Low  8-20  

 

 Calcium  9.8 mg/dL    8.1-9.9  44

 

 eGFR Non-  76.3    > 60  

 

 eGFR   92.3    > 60  45









 CBC No Diff  2010  [facility]  White Blood Count  9.2 CUMM    
4.8-10.8  



     [place] [address]          



     [phone]          









 Red Cell Count  5.50 CUMM    4.6-6.2  

 

 Hemoglobin  17.0 g/dL    14.0-18.0  

 

 Hematocrit  49 %    42-52  

 

 Mean Corpuscular Volume  88 um3    80-94  

 

 Mean Corpuscular Hemoglob  31 pg    27-31  

 

 Mean Corpuscular HGB Cone  35 g/dL    32-36  

 

 Platelet Count  240 CUMM    150-450  









 Xray  07/15/2009  [facility]  Chest, 2 Views  WNL      



     [address]          



     [place] [address]          



     [phone]          

 

 Laboratory test  2009  [facility]  C Reactive  1.2 mg/L    < 
7.48  46, 47



 [place] [address]  Protein High        



     [phone]  Sensit        

 

 Urinalysis  2009  [facility]  Ua Color  YELLOW      



     [place] [address]          



     [phone]          









 Appearance-Urine  CLEAR      

 

 Specific Gravity-Ur  1.014    1.010-1.030  

 

 Esterase-Urine  NEGATIVE    Negative  

 

 Nitrite  NEGATIVE    Negative  

 

 Urobilinogen-Ur-DIP  NEGATIVE    Negative  

 

 Protein-Urine  NEGATIVE    Negative  

 

 PH-Urine  7.0    5-9  

 

 Blood-Urine  NEGATIVE    Negative  

 

 Ketones-Urine  NEGATIVE    Negative  

 

 Bilirubin-Ur  NEGATIVE    Negative  

 

 Glucose-Urine  NEGATIVE    Negative  









 Laboratory test  2009  [facility]  CRP High  1.2    <7.48  



 [place] [address]  Sensitivity        



     [phone]          

 

 Lipid Profile  2009  [facility]  Triglyceride  196 mg/dL    40
-200  



 (Trig/Chol/[place] [address] [phone]          









 Cholesterol  218 mg/dL  High  Less Than 200  48

 

 High Density Lipoprotein  48 mg/dL    40-60  49

 

 Cholesterol/HDL Ratio  4.54 AVERAGE    1-4.97  

 

 Low Density Lipoprotein  131 mg/dL  High  Less Than 100  50









 Comp Metabolic Panel  2009  [facility]  Sodium  139 mmol/L    
135-145  



     [place] [address] [phone]          









 Potassium  5.0 mmol/L    3.5-5.0  

 

 Chloride  101 mmol/L    101-111  

 

 Co2 (Carbon Dioxide)  30.0 mmol/L    22-32  

 

 Anion Gap  8.0 mmol/L    2-11  51

 

 Glucose  84 mg/dL      52

 

 BUN  8 mg/dL    6-24  

 

 Creatinine  1.20 mg/dL    0.50-1.40  

 

 One Over Creatinine  0.80      

 

 BUN/Creatinine Ratio  6.7  Low  8-20  

 

 Calcium  9.7 mg/dL    8.1-9.9  53

 

 Total Protein  6.6 GM/DL    6.2-8.1  

 

 Albumin  4.2 GM/DL    3.6-5.4  

 

 Globulin  2.4 GM/DL    2-4  

 

 Albumin/Globulin Ratio  1.8    1-3  

 

 Bilirubin Total  0.5 mg/dL    0.4-1.5  54

 

 Alkaline Phosphatase  69 U/L      

 

 Alt (SGPT)  20 U/L    17-63  

 

 Ast (Sgot)  24 U/L    12-42  

 

 eGFR Non-  69.3    > 60  

 

 eGFR   83.8    > 60  55









 Hemogram  2009  [facility]  White Blood Count  8.9 CUMM    4.8
-10.8  



     [place] [address] [phone]          









 Red Cell Count  5.25 CUMM    4.6-6.2  

 

 Hemoglobin  15.9 g/dL    14.0-18.0  

 

 Hematocrit  47 %    42-52  

 

 Mean Corpuscular Volume  90 um3    80-94  

 

 Mean Corpuscular Hemoglob  30 pg    27-31  

 

 Mean Corpuscular HGB Cone  34 g/dL    32-36  

 

 Platelet Count  230 CUMM    150-450  









 1  Desirable: <150



   Borderline High: 150-199



   High: 200-499



   Very High: >500

 

 2  Desirable: <200



   Borderline High: 200-239



   High: >239

 

 3  Low: <40



   Desirable: 40-60



   High: >60

 

 4  Desirable: <100



   Near Optimal: 100-129



   Borderline High: 130-159



   High: 160-189



   Very High: >189

 

 5  FASTING

 

 6  *******Because ethnic data is not always readily available,



   this report includes an eGFR for both -Americans and



   non- Americans.****



   The National Kidney Disease Education Program (NKDEP) does



   not endorse the use of the MDRD equation for patients that



   are not between the ages of 18 and 70, are pregnant, have



   extremes of body size, muscle mass, or nutritional status,



   or are non- or non-.



   According to the National Kidney Foundation, irrespective of



   diagnosis, the stage of the disease is based on the level of



   kidney function:



   Stage Description                      GFR(mL/min/1.73 m(2))



   1     Kidney damage with normal or decreased GFR       90



   2     Kidney damage with mild decrease in GFR          60-89



   3     Moderate decrease in GFR                         30-59



   4     Severe decrease in GFR                           15-29



   5     Kidney failure                       <15 (or dialysis)

 

 7  [id number]

 

 8  SEE RESULT BELOW



   -----------------------------------------------------------------------------
---------------



   Name:  SYD BURTON               : 1960    Attend Dr: Parminder Oropeza MD



   Acct:  [id number]  Unit: [id number]  AGE: 57            Location:  [facility]



   Re18                        SEX: M             Status:    DEP REF



   -----------------------------------------------------------------------------
---------------



   



   SPEC: [id number]             SHAILESH: 18-          SUBM DR: Parminder Oropeza MD



   REQ:  [id number]             RECD: 



   STATUS: JULIETA FLOREZ DR: Harris Wilkerson MD



   _



   ORDERED:  LEVEL 4



   COMMENTS: [id number]



   



   FINAL DIAGNOSIS



   



   



   Duodenum, third portion, biopsy:



   -- Benign small intestinal mucosa with no significant pathologic 
abnormalities.



   -- No evidence of villous blunting or increased intraepithelial lymphocytes.



   



   



   



   CLINICAL HISTORY



   



   Iron deficiency anemia



   



   POST-OPERATIVE DIAGNOSIS



   



   EGD: larynx ? symmetric; esophagus ? normal, EG 41, a little columnar, no 
erosions; stomach



   ? normal; duodenum ? normal, biopsy x2 at third portion; conclusion/plan: 
normal EGD; iron



   deficiency anemia ? mild



   



   GROSS DESCRIPTION



   



   The specimen is received in formalin labeled, Biopsy Third Portion Duodenum, 
and consists of



   two speckled tan-white irregular to polypoid soft tissue fragments measuring 
0.4 x 0.2 x 0.1



   cm and 0.6 by up to 0.3 x 0.1 cm which are entirely submitted in one 
cassette.



   



   Signed by and Reported on: __________              Zehra Bowman MD 
05/15/18 1155



   



   -----------------------------------------------------------------------------
---------------



   



   



   



   



   



   



   



   



   ** END OF REPORT **



   



   DEPARTMENT OF PATHOLOGY,  [address] [place] [address]



   Phone # [phone]      Fax #[phone]



   Brenden Drew M.D. Director     [facility] # [id number]

 

 9  [id number]

 

 10  SEE RESULT BELOW



   -----------------------------------------------------------------------------
---------------



   Name:  SYD BURTON               : 1960    Attend Dr: Parminder Oropeza MD



   Acct:  [id number]  Unit: [id number]  AGE: 57            Location:  [facility]



   Re18                        SEX: M             Status:    DEP REF



   -----------------------------------------------------------------------------
---------------



   



   SPEC: [id number]         SHAILESH:       [facility] DR: Parminder Oropeza MD



   REQ:  [id number]              RECD:   18-



   STATUS: COMP             GAUTAM DR: Harris Wilkerson MD



   _



   SOURCE: GAS ANTRUM     SPDESC:



   ORDERED:  Clotest



   COMMENTS: [id number]



   



   -----------------------------------------------------------------------------
---------------



   Procedure                         Result                         Reported   
        Site



   -----------------------------------------------------------------------------
---------------



   Clotest  Final                                                   05/15/18-
0901      ML



   Clotest                     Negative



   



   -----------------------------------------------------------------------------
---------------



   *  - [facility]



   .



   



   



   



   



   



   



   



   



   



   



   



   



   



   



   



   



   



   



   



   



   



   



   



   



   



   



   ** END OF REPORT **



   



   DEPARTMENT OF PATHOLOGY,  [address] [place] [address]



   Phone # [phone]      Fax #[phone]



   Brenden Drew M.D. Director     [facility] # [id number]

 

 11  [id number]

 

 12  SEE RESULT BELOW



   -----------------------------------------------------------------------------
---------------



   Name:  BURTONSYD               : 1960    Attend Dr: Parminder Oropeza MD



   Acct:  [id number]  Unit: [id number]  AGE: 57            Location:  [facility]



   Re18                        SEX: M             Status:    DEP REF



   -----------------------------------------------------------------------------
---------------



   



   SPEC: [id number]         SHAILESH:       [facility] DR: Parminder Oropeza MD



   REQ:  [id number]              RECD:   18-



   STATUS: COMP             GAUTAM DR: Harris Wilkerson MD



   _



   SOURCE: STOOL          SPDESC:



   ORDERED:  Occult Bl, Elder



   COMMENTS: [id number]



   



   



   -----------------------------------------------------------------------------
---------------



   Procedure                         Result                         Reported   
        Site



   -----------------------------------------------------------------------------
---------------



   Stool Occult Blood (1)  Final                                    18-
1124      ML



   Stool Occult Blood          Negative



   



   -----------------------------------------------------------------------------
---------------



   * ML - Main Lab



   .



   



   



   



   



   



   



   



   



   



   



   



   



   



   



   



   



   



   



   



   



   



   



   



   



   



   ** END OF REPORT **



   



   DEPARTMENT OF PATHOLOGY,  [address] [place] [address]



   Phone # [phone]      Fax #[phone]



   Brenden Drew M.D. Director     [facility] # [id number]

 

 13  FASTING

 

 14  Therapeutic target for the treatment of diabetes



   mellitus patients is <7% HBA1C, and in selective



   patients <6.0%.  Please refer to American Diabetes



   Association diabetic care guidelines for further



   information.

 

 15  FASTING

 

 16  FASTING

 

 17  FASTING

 

 18  *******Because ethnic data is not always readily available,



   this report includes an eGFR for both -Americans and



   non- Americans.****



   The National Kidney Disease Education Program (NKDEP) does



   not endorse the use of the MDRD equation for patients that



   are not between the ages of 18 and 70, are pregnant, have



   extremes of body size, muscle mass, or nutritional status,



   or are non- or non-.



   According to the National Kidney Foundation, irrespective of



   diagnosis, the stage of the disease is based on the level of



   kidney function:



   Stage Description                      GFR(mL/min/1.73 m(2))



   1     Kidney damage with normal or decreased GFR       90



   2     Kidney damage with mild decrease in GFR          60-89



   3     Moderate decrease in GFR                         30-59



   4     Severe decrease in GFR                           15-29



   5     Kidney failure                       <15 (or dialysis)

 

 19  SEE RESULT BELOW



   -----------------------------------------------------------------------------
---------------



   Name:  SYD BURTON               : 1960    Attend Dr: Parminder Oropeza MD



   Acct:  [id number]  Unit: [id number]  AGE: 56            Location:  [facility]



   Re/10/17                        SEX: M             Status:    REG REF



   -----------------------------------------------------------------------------
---------------



   



   SPEC: [id number]         SHAILESH:   08/10/    [facility] DR: Parminder Oropeza MD



   REQ:  [id number]              RECD:   08/10/



   STATUS: ARCHIE FLOREZ DR: Harris Wilkerson MD



   _



   SOURCE: GAS ANTRUM     SPDESC:



   ORDERED:  Clotest



   



   -----------------------------------------------------------------------------
---------------



   Procedure                         Result                         Reported   
        Site



   -----------------------------------------------------------------------------
---------------



   Clotest  Final                                                   17-
732      ML



   Clotest                     Negative



   



   -----------------------------------------------------------------------------
---------------



   * ML - [facility] ([facility])



   .



   



   



   



   



   



   



   



   



   



   



   



   



   



   



   



   



   



   



   



   



   



   



   



   



   



   



   



   ** END OF REPORT **



   



   * ML=Testing performed at Main Lab



   DEPARTMENT OF PATHOLOGY,  [address] [place] [address]



   Phone # [phone]      Fax #[phone]



   Brenden Drew M.D. Director     [facility] # [id number]

 

 20  FASTING

 

 21  FASTING

 

 22  Acute inflammation:  >10.00

 

 23  Desirable <150



   Borderline high 150-199



   High 200-499



   Very High >500

 

 24  Desirable <200



   Borderline high 200-239



   High >239

 

 25  Low <40



   Desirable: 40-60



   High: >60

 

 26  Desirable: <100 mg/dL



   Near Optimal: 100-129 mg/dL



   Borderline High: 130-159 mg/dL



   High: 160-189 mg/dL



   Very High: >189 mg/dL

 

 27  Normal Range 180 to 914



   Indeterminate Range 145 to 180



   Deficient Range  <145

 

 28  -------------------REFERENCE VALUE--------------------------



   <4.0 (Negative)

 

 29  -------------------REFERENCE VALUE--------------------------



   <6.0 (Negative)



   Test Performed by:



   [facility] - [address] [address] [id number]  **Please note:



   The following may produce a false positive D Dimer test:



   - Rheumatoid factor greater than 60 IU/ml



   - Plasma hemoglobin greater than 0.05 gm/dl



   - Bilirubin greater than 50 mg/dl



   - Lipids greater than 1000 mg/dl



   - FDP greater than 20 ug/ml

 

 31  Specimen hemolyzed. Result may not be valid.



   [facility] Severe Sepsis and Septic Shock Management Bundle Measure



   requires all lactic acids initially measuring >2.0 mmol/L be



   repeated.

 

 32  *******Because ethnic data is not always readily available,



   this report includes an eGFR for both -Americans and



   non- Americans.****



   The National Kidney Disease Education Program (NKDEP) does



   not endorse the use of the MDRD equation for patients that



   are not between the ages of 18 and 70, are pregnant, have



   extremes of body size, muscle mass, or nutritional status,



   or are non- or non-.



   According to the National Kidney Foundation, irrespective of



   diagnosis, the stage of the disease is based on the level of



   kidney function:



   Stage Description                      GFR(mL/min/1.73 m(2))



   1     Kidney damage with normal or decreased GFR       90



   2     Kidney damage with mild decrease in GFR          60-89



   3     Moderate decrease in GFR                         30-59



   4     Severe decrease in GFR                           15-29



   5     Kidney failure                       <15 (or dialysis)

 

 33  Acute inflammation:  >10.00

 

 34  >100 to <200 pg/mL: likely compensated congestive heart



   failure (CHF)



   200 to 400 pg/mL: likely moderate CHF



   >400 pg/mL: likely moderate to severe CHF

 

 35  SEE RESULT BELOW



   -----------------------------------------------------------------------------
---------------



   Name:  SYD BURTON               : 1960    Attend Dr: Emily House MD



   Acct:  [id number]  Unit: [id number]  AGE: 56            Location:  [facility]



   Re/10/17                        SEX: M             Status:    DEP ER



   -----------------------------------------------------------------------------
---------------



   



   SPEC: [id number]         SHAILESH:   07/10/    [facility] DR: Emily House MD



   REQ:  [id number]              RECD:   07/10/



   STATUS: ARCHIE FLOREZ DR: Harris Wilkerson MD



   _



   SOURCE: BLOOD,VENO     SPDES:



   ORDERED:  Blood Cult



   



   -----------------------------------------------------------------------------
---------------



   Procedure                         Result                         Reported   
        Site



   -----------------------------------------------------------------------------
---------------



   Aerobic Culture Bottle  Final                                    07/15/17-
0811      ML



   No Growth Day 5



   



   Anaerobic Culture Bottle  Final                                  07/15/17-
0811      ML



   No Growth Day 5



   



   -----------------------------------------------------------------------------
---------------



   * ML - MAIN LAB ([facility])



   .



   



   



   



   



   



   



   



   



   



   



   



   



   



   



   



   



   



   



   



   



   



   



   



   



   ** END OF REPORT **



   



   * ML=Testing performed at Main Lab



   DEPARTMENT OF PATHOLOGY,  [address] [place] [address]



   Phone # [phone]      Fax #[phone]



   Brenden Drew M.D. Director     [facility] # [id number]

 

 36  SEE RESULT BELOW



   -----------------------------------------------------------------------------
---------------



   Name:  SYD BURTON ANTONY               : 1960    Attend Dr: Emily House MD



   Acct:  [id number]  Unit: [id number]  AGE: 56            Location:  [facility]



   Re/10/17                        SEX: M             Status:    DEP ER



   -----------------------------------------------------------------------------
---------------



   



   SPEC: [id number]         SHAILESH:   07/10/    [facility] DR: Emily House MD



   REQ:  [id number]              RECD:   07/10/



   STATUS: ARCHIE FLOREZ DR: Harris Wilkerson MD



   _



   SOURCE: SPUTUM         SPDESC:



   ORDERED:  Sputum Cult/GS



   



   -----------------------------------------------------------------------------
---------------



   Procedure                         Result                         Reported   
        Site



   -----------------------------------------------------------------------------
---------------



   Sputum Smear  Final                                              07/10/17-
0908      ML



   4+ Neutrophils



   1+ Epithelial Cells



   



   4+ Gram Negative Bacilli



   2+ Gram Positive Cocci



   1+ Gram Positive Bacilli



   



   Sputum Culture  Final                                            17-
914      ML



   



   Organism 1                     HAEMOPHILUS INFLUENZAE



   Quantity                    3+



   Beta Lactamase              Negative



   Organism 2                     NORMAL CANDIDA



   Quantity                    1+



   



   -----------------------------------------------------------------------------
---------------



   * ML - MAIN LAB ([facility])



   .



   



   



   



   



   



   



   



   



   



   



   



   



   



   



   ** END OF REPORT **



   



   * ML=Testing performed at Main Lab



   DEPARTMENT OF PATHOLOGY,  [address] [place] [address]



   Phone # [phone]      Fax #[phone]



   Brenden Drew M.D. Director     [facility] # [id number]

 

 37  *******Because ethnic data is not always readily available,



   this report includes an eGFR for both -Americans and



   non- Americans.****



   The National Kidney Disease Education Program (NKDEP) does



   not endorse the use of the MDRD equation for patients that



   are not between the ages of 18 and 70, are pregnant, have



   extremes of body size, muscle mass, or nutritional status,



   or are non- or non-.



   According to the National Kidney Foundation, irrespective of



   diagnosis, the stage of the disease is based on the level of



   kidney function:



   Stage Description                      GFR(mL/min/1.73 m(2))



   1     Kidney damage with normal or decreased GFR       90



   2     Kidney damage with mild decrease in GFR          60-89



   3     Moderate decrease in GFR                         30-59



   4     Severe decrease in GFR                           15-29



   5     Kidney failure                       <15 (or dialysis)

 

 38  Serum levels of PSA measured using the [employer]



   DXI Hybritech immunoassay should not be interpreted as



   absolute evidence of the presence or absence of disease.



   The PSA value should be used in conjunction with other



   pertinent clinical diagnostic procedures.



   



   A PSA value in the range of 0.1 to 0.6 ng/ml is



   indeterminate if being used as an indicator of recurrent or



   residual disease.



   



   The values obtained with different assay methods or kits



   cannot be used interchangeably.

 

 39  CHOLESTEROL INTERPRETATION:



   Desirable:  Less than 200 MG/DL



   Borderline-High Risk:  200-239 MG/DL



   High-Risk:  240 MG/DL and over

 

 40  HDL INTERPRETATION:



   Undesirable: High Risk:  Less than 40 MG/DL



   Desirable:  Low Risk:  Greater than 60 MG/DL

 

 41  LDL INTERPRETATION:



   Low Risk Optimal Level:  LDL Less than 100 MG/DL



   Near or Above Optimal:  -129 MG/DL



   Borderline High Risk:  -159 MG/DL



   High Risk:  -189 MG/DL



   Very High Risk:  LDL Greater than 189 MG/DL

 

 42  Anion gap measurement may be of limited value in the



   presence of any alkalosis, especially in a combined acid



   base disorder.



   .

 

 43  ** Note change in reference range as of 08.  The



   change was based on recommendations from the American



   Diabetes Association.

 

 44  Please note change in reference range effective 08



   



   



   .

 

 45  *******Because ethnic data is not always readily available,



   this report includes an eGFR for both -Americans and



   non- Americans.****



   The National Kidney Disease Education Program (NKDEP) does



   not endorse the use of the MDRD equation for patients that



   are not between the ages of 18 and 70, are pregnant, have



   extremes of body size, muscle mass, or nutritional status,



   or are non- or non-.



   According to the National Kidney Foundation, irrespective of



   diagnosis, the stage of the disease is based on the level of



   kidney function:



   Stage Description                      GFR(mL/min/1.73 m(2))



   1     Kidney damage with normal or decreased GFR       90



   2     Kidney damage with mild decrease in GFR          60-89



   3     Moderate decrease in GFR                         30-59



   4     Severe decrease in GFR                           15-29



   5     Kidney failure                       <15 (or dialysis)

 

 46  FASTING

 

 47  Less Than 1.0......Low Risk of Cardiovascular Disease



   1.0-3.0............Medium Risk (<2 Fold Increase)



   Greater Than 3.0...High Risk (Approximately 2-Fold Increase)



   



   The above guidelines are referenced in "Markers of



   Inflammation and Cardiovascular Disease: Application to



   Clinical and Public Health Practice." A Statement for Health



   Professionals from the Centers for Disease Control and



   Prevention and the American Heart Association.



   (Reference: Circulation 2003 107:499-511)



   



   SERUM LEVELS OF HIGH SENSITIVITY C-REACTIVE PROTEIN AS



   MEASURED BY THE LEXIE NAYANA LXi 725 SYSTEM SHOULD NOT BE



   INTERPRETTED AS ABSOLUTE EVIDENCE OF THE PRESENCE OR ABSENCE



   OF DISEASE.  A HIGH SENSITIVITY CRP VALUE SHOULD BE USED IN



   CONJUNCTION WITH OTHER PERTINENT CLINICAL AND DIAGNOSTIC



   INFORMATION.

 

 48  CHOLESTEROL INTERPRETATION:



   Desirable:  Less than 200 MG/DL



   Borderline-High Risk:  200-239 MG/DL



   High-Risk:  240 MG/DL and over

 

 49  HDL INTERPRETATION:



   Undesirable: High Risk:  Less than 40 MG/DL



   Desirable:  Low Risk:  Greater than 60 MG/DL

 

 50  LDL INTERPRETATION:



   Low Risk Optimal Level:  LDL Less than 100 MG/DL



   Near or Above Optimal:  -129 MG/DL



   Borderline High Risk:  -159 MG/DL



   High Risk:  -189 MG/DL



   Very High Risk:  LDL Greater than 189 MG/DL

 

 51  Anion gap measurement may be of limited value in the



   presence of any alkalosis, especially in a combined acid



   base disorder.



   .

 

 52  ** Note change in reference range as of 08.  The



   change was based on recommendations from the American



   Diabetes Association.

 

 53  Please note change in reference range effective 08



   



   



   .

 

 54  A metabolite of Naproxen, O-desmethylnaproxen, has been



   shown to interfere with the Jendrassik-Sunny method for



   measuring total bilirubin.  Samples from patients who have



   taken Naproxen have shown spurious elevation in total



   bilirubin levels.

 

 55  *******Because ethnic data is not always readily available,



   this report includes an eGFR for both -Americans and



   non- Americans.****



   The National Kidney Disease Education Program (NKDEP) does



   not endorse the use of the MDRD equation for patients that



   are not between the ages of 18 and 70, are pregnant, have



   extremes of body size, muscle mass, or nutritional status,



   or are non- or non-.



   According to the National Kidney Foundation, irrespective of



   diagnosis, the stage of the disease is based on the level of



   kidney function:



   Stage Description                      GFR(mL/min/1.73 m(2))



   1     Kidney damage with normal or decreased GFR       90



   2     Kidney damage with mild decrease in GFR          60-89



   3     Moderate decrease in GFR                         30-59



   4     Severe decrease in GFR                           15-29



   5     Kidney failure                       <15 (or dialysis)







Procedures







 Date  Code  Description  Status

 

 2017  [id number]  Colonoscopy  Completed







Encounters







 Type  Date  Location  Provider  Dx  Diagnosis

 

 Office Visit  2019  Main Office  Harris Wilkerson,  J32.9  Chronic 
sinusitis,



   12:00p    MD    unspecified









 J01.90  Acute sinusitis, unspecified

 

 Z71.6  Tobacco abuse counseling

 

 J44.9  Chronic obstructive pulmonary disease, unspecified









 Office Visit  2019 11:00a  Main Office  Harris DOWLING01.90  Acute 
sinusitis,



       MD Rhea    unspecified









 J44.9  Chronic obstructive pulmonary disease, unspecified

 

 Z71.6  Tobacco abuse counseling









 Office Visit  10/25/2018 11:00a  Main Office  Harris LUQUE  Z00.01  Encounter for



       MD Rhea    general adult



           medical exam w



           abnormal findings









 J44.9  Chronic obstructive pulmonary disease, unspecified

 

 Z71.6  Tobacco abuse counseling

 

 Z87.11  Personal history of peptic ulcer disease

 

 R73.01  Impaired fasting glucose

 

 Z23  Encounter for immunization









 Office Visit  2018  9:40a  Main Office  Harris LUQUE  D50.9  Iron 
deficiency



       MD Rhea    anemia, unspecified









 Z87.11  Personal history of peptic ulcer disease

 

 J44.9  Chronic obstructive pulmonary disease, unspecified

 

 R73.01  Impaired fasting glucose

 

 Z71.6  Tobacco abuse counseling









 Office Visit  2018 11:00a  Main Office  Harris LUQUE  D50.9  Iron 
deficiency



       MD Rhea    anemia, unspecified









 Z87.11  Personal history of peptic ulcer disease

 

 Z71.6  Tobacco abuse counseling

 

 J44.9  Chronic obstructive pulmonary disease, unspecified

 

 R73.01  Impaired fasting glucose

 

 D72.829  Elevated white blood cell count, unspecified









 Office Visit  2018 11:40a  Main Office  Harris DOWLING44.9  Be 
obstructive



       MD Rhea    pulmonary disease,



           unspecified









 Z71.6  Tobacco abuse counseling

 

 D72.829  Elevated white blood cell count, unspecified

 

 Z87.11  Personal history of peptic ulcer disease

 

 R73.01  Impaired fasting glucose

 

 Z82.49  Family hx of ischem heart dis and oth dis of the circ sys









 Office Visit  2017 11:40a  Main Office  Harris DOWLING[address] 
obstructive



       MD Rhea    pulmonary disease,



           unspecified









 Z71.6  Tobacco abuse counseling

 

 K26.9  Duodenal ulcer, unsp as acute or chronic, w/o hemor or perf









 Office Visit  10/24/2017 11:00a  Main Office  Harris LUQUE  Z00.01  Encounter for



       MD Rhea    general adult



           medical exam w



           abnormal findings









 J44.9  Chronic obstructive pulmonary disease, unspecified

 

 K26.9  Duodenal ulcer, unsp as acute or chronic, w/o hemor or perf

 

 R63.4  Abnormal weight loss

 

 Z71.6  Tobacco abuse counseling

 

 Z23  Encounter for immunization









 Office Visit  2017 11:40a  Main Office  Harris Wilkerson,  R63.4  
Abnormal weight



       MD    loss









 D64.9  Anemia, unspecified

 

 K26.9  Duodenal ulcer, unsp as acute or chronic, w/o hemor or perf

 

 K29.30  Chronic superficial gastritis without bleeding

 

 Z71.6  Tobacco abuse counseling

 

 J44.9  Chronic obstructive pulmonary disease, unspecified









 Office Visit  2017 10:20a  Main Office  Harris Wilkerson,  R63.4  
Abnormal weight



       MD    loss









 D64.9  Anemia, unspecified









 Office Visit  2017 10:00a  Main Office  Harris Wilkerson,  R63.4  
Abnormal weight



       MD    loss









 D64.9  Anemia, unspecified

 

 J44.9  Chronic obstructive pulmonary disease, unspecified

 

 R79.9  Abnormal finding of blood chemistry, unspecified









 Office Visit  2017 11:00a  Main Office  Harris LUQUE  J44.1  Chronic 
obstructive



       MD Rhea    pulmonary disease w



           (acute) exacerbation









 J44.9  Chronic obstructive pulmonary disease, unspecified









 Office Visit  2017  1:00p  Main Office  Harris Wilkerson,  R63.4  
Abnormal weight



       MD    loss









 D64.9  Anemia, unspecified

 

 J44.9  Chronic obstructive pulmonary disease, unspecified

 

 R73.01  Impaired fasting glucose

 

 Z23  Encounter for immunization









 Office Visit  2017  2:40p  Main Office  Harris Wilkerson  R63.4  
Abnormal weight



       MD    loss









 R05  Cough

 

 Z71.6  Tobacco abuse counseling









 Office Visit  2010 10:00a  Main Office  Harris LUQUE  V70.0  Examination 
General



       MD Rhea    Medical Routine AT



           Health Care



           Facility









 V17.3  History Family Ischemic Heart Disease

 

 272.0  Hypercholesterolemia Pure

 

 305.1  Tobacco Use Disorder

 

 786.00  Respiratory Abnormality Unspec









 Office Visit  2010  2:40p  Main Office  Harris Wilkerson,  719.45  Pain 
Joint



       MD    Pelvic Region &



           Thigh









 726.5  Enthesopathy Of Hip Region

 

 305.1  Tobacco Use Disorder









 Office Visit  2010 11:20a  Main Office  Harris Wilkerson,  719.45  Pain 
Joint



       MD    Pelvic Region &



           Thigh









 726.5  Enthesopathy Of Hip Region

 

 401.9  Hypertension Unspec

 

 305.1  Tobacco Use Disorder









 Office Visit  02/15/2010 11:00a  Main Office  Harris Wilkerson MD  784.0  
Headache









 386.11  Vertigo Benign Paroxysmal Position

 

 305.1  Tobacco Use Disorder









 Office Visit  2010  3:00p  Main Office  Harris Wilkerson MD  784.0  
Headache









 386.11  Vertigo Benign Paroxysmal Position

 

 780.52  Insomnia Unspecified

 

 305.1  Tobacco Use Disorder









 Office Visit  2010 10:20a  Main Office  Harris Wilkerson,  780.79  
Malaise And



       MD    Fatigue Other









 780.52  Insomnia Unspecified

 

 784.0  Headache

 

 386.11  Vertigo Benign Paroxysmal Position

 

 305.1  Tobacco Use Disorder









 Office Visit  2009  9:00a  Main Office  Harris LUQEU  V70.0  Examination 
General



       MD Rhea    Medical Routine AT



           Health Care



           Facility









 305.1  Tobacco Use Disorder

 

 386.11  Vertigo Benign Paroxysmal Position

 

 V17.3  History Family Ischemic Heart Disease

 

 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







Plan of Treatment

Future Appointment(s):2019 11:40 am - Harris Wilkerson MD at Main 
[id number] - Harris Wilkerson MDJ32.9 Chronic sinusitis, unspecifiedNew 
Medication:Nasonex 50 mcg/Act - 1 intranasal twice a dayComments:Nasal spray 
advised while waiting for ENT evaluation.Referral:No Doctor RkgkcblmE15.90 
Acute sinusitis, unspecifiedComments:No response to 2 courses of AB by urgent 
care and no response to prednisone.Referred to ENT.Z71.6 Tobacco abuse 
kdwklenaunY53.9 Chronic obstructive pulmonary disease, unspecified

## 2019-01-27 NOTE — ED
Throat Pain/Nasal Congestion





- HPI Summary


HPI Summary: 


Pt is a 59 y/o male who presents to the ED c/o sore throat. He has been losing 

weight and having low iron levels for a while, as per wife. One month ago his 

symptoms began to worsen. He now c/o severe headaches, congestion, runny nose, 

sore throat, difficulty swallowing, and hoarseness. Eating and drinking make 

the symptoms worse, and saline nasal spray relives them. He rates his currently 

pain a 10/10 in severity. As per wife, his PCP has suspected a possible CA for 

a while and has been trying to find it. He went to Dr. Warren who did a 

scope of his sinuses and was flabbergasted by the findings. He suspects cancer

, and stated that there is a malignancy in his overlapping sinuses. Pt was 

given Norco, but he cannot take them because they come out of his nose when he 

swallows. Dr. Warren ordered a CT, but the pt did not have one yet. Pt is a 

heavy everyday smoker.








- History of Current Complaint


Chief Complaint: EDHeadache


Time Seen by Provider: 01/27/19 09:22


Hx Obtained From: Patient, Family/Caretaker - Wife


Onset/Duration: Gradual Onset, Still Present, Worse Since


Associated Signs And Symptoms: Positive: Dysphagia, Hoarseness, Sinus Discomfort

, Nasal Discharge


Cough: None


Related History: Smoking





- Allergies/Home Medications


Allergies/Adverse Reactions: 


 Allergies











Allergy/AdvReac Type Severity Reaction Status Date / Time


 


No Known Allergies Allergy   Verified 05/14/18 09:05














PMH/Surg Hx/FS Hx/Imm Hx


Endocrine/Hematology History: 


   Denies: Hx Diabetes, Hx Thyroid Disease


Cardiovascular History: 


   Denies: Hx Hypertension


Respiratory History: Reports: Hx Chronic Obstructive Pulmonary Disease (COPD)


   Denies: Hx Asthma


GI History: 


   Denies: Hx Ulcer


 History: 


   Denies: Hx Renal Disease


Infectious Disease History: No


Infectious Disease History: 


   Denies: Hx Hepatitis, Hx Human Immunodeficiency Virus (HIV), Traveled 

Outside the [place] in Last 30 Days





- Family History


Known Family History: Positive: Other - Emphysema





- Social History


Alcohol Use: Daily


Alcohol Amount: beer


Hx Substance Use: Yes


Substance Use Type: Reports: Other


Substance Use Comment - Amount & Last Used: non prescribed oxy per wife


Hx Tobacco Use: Yes


Smoking Status (MU): Heavy Every Day Tobacco Smoker





Review of Systems


Positive: Sore Throat, Nasal Discharge, Other - congestion, difficulty 

swallowing, hoarseness


Positive: Headache


All Other Systems Reviewed And Are Negative: Yes





Physical Exam





- Summary


Physical Exam Summary: 


Appearance: chronically-ill appearing, no pain distress, weight loss


Skin: warm, dry, reflects adequate perfusion


Head/face: normal


Eyes: EOMI, OPAL


ENT: mucous membranes moist, bloody mucus in left nare, fullness in posterior 

pharynx and soft palate, coarse voice, uvula necrotic appearing


Neck: supple, non-tender


Respiratory: CTA, breath sounds present


Cardiovascular: RRR, pulses symmetrical 


Abdomen: non-tender, soft


Bowel Sounds: present


Musculoskeletal: normal, strength/ROM intact


Neuro: normal, sensory motor intact, A&Ox3





Triage Information Reviewed: Yes


Vital Signs On Initial Exam: 


 Initial Vitals











Temp Pulse Resp BP Pulse Ox


 


 97.8 F   92   20   135/89   93 


 


 01/27/19 08:40  01/27/19 08:40  01/27/19 08:40  01/27/19 08:40  01/27/19 08:40











Vital Signs Reviewed: Yes





Diagnostics





- Vital Signs


 Vital Signs











  Temp Pulse Resp BP Pulse Ox


 


 01/27/19 08:40  97.8 F  92  20  135/89  93














- Laboratory


Result Diagrams: 


 01/27/19 09:37





 01/27/19 09:37


Lab Statement: Any lab studies that have been ordered have been reviewed, and 

results considered in the medical decision making process.





- CT


  ** Sinuses CT


CT Interpretation Completed By: Radiologist


Summary of CT Findings: Pansinusitis with mucosal thickening of the maxillary 

sinuses, ethmoid air.  cells, sphenoid sinuses and frontal sinuses. There is 

soft tissue extension into the nasal cavity. ED physician reviewed radiology 

report.





  ** Neck CT


CT Interpretation Completed By: Radiologist


Summary of CT Findings: Marked soft tissue swelling in the tonsillar pillars 

with suggestion of a.  small 0.4 cm low density in the right peritonsillar 

tissue. Flocculent material is noted in the nasopharynx. The nasopharynx is 

swollen and edematous. Moderate lymphadenopathy along the carotid chains are 

noted bilaterally. ED physician reviewed radiology report.





Re-Evaluation





- Re-Evaluation


  ** First Eval


Re-Evaluation Time: 11:06


Change: Unchanged


Comment: Discussed CT results.





EENT Course/Dx





- Course


Course Of Treatment: Nurse's notes reviewed.  Patient with necrotic area seen 

in his naris and swelling in the posterior pharynx.  The uvula appears 

darkened.  CT scans were performed as above.  There is possibility in talking 

with the ear nose and throat surgeon of malignancy, most likely lymphoma.  

There is some suggestion of mass in the right posterior tonsillar area.  There 

is pansinusitis which will be covered with steroid, antibiotic and 

decongestant.  The ENT surgeon agreed with this.  He will follow him up this 

week.





- Differential Diagnoses


Differential Diagnoses: Other - Malignancy, a fungal infection, bacterial 

infection





- Diagnoses


Provider Diagnoses: 


 Pansinusitis, Pharyngeal cancer








- Provider Notifications


Discussed Care Of Patient With: Petar Warren


Time Discussed With Above Provider: 11:17


Instructed by Provider To: Other - He agrees with the treatment plan, and 

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





Discharge





- Sign-Out/Discharge


Documenting (check all that apply): Patient Departure - Discharge





- Discharge Plan


Condition: Improved


Disposition: HOME


Prescriptions: 


Amoxicillin/Clavulanate TAB* [Augmentin *] 875 mg PO BID #28 tab


Dexamethasone TAB* [Decadron TAB*] 8 mg PO DAILY #10 tab


Guaifenesin/Pseudoephedrne HCl [Mucinex D ER 1,200-120 mg Tab] 1 each PO BID #

14 tab.er.12h


Patient Education Materials:  Sinusitis (ED)


Referrals: 


Harris Wilkerson MD [Primary Care Provider] - 


Petar Warren MD [Medical Doctor] - 


Additional Instructions: 


Stop smoking.  There is some suggestion of the possibility of cancer in the 

right tonsillar region as discussed.  Return with fever, occult he breathing/

swallowing, worse, new symptoms or other concerns as discussed.  Follow up 

promptly with the ear nose and throat surgeon.





- Billing Disposition and Condition


Condition: IMPROVED


Disposition: Home





- Attestation Statements


Document Initiated by Vicenteibheena: Yes


Documenting Scribe: Blaire Altamirano


Provider For Whom Rai is Documenting (Include Credential): Santiago Dale MD


Scribe Attestation: 


Blaire STEELE scribed for Santiago Dale MD on 01/27/19 at 1820. 


Scribe Documentation Reviewed: Yes


Provider Attestation: 


The documentation as recorded by the Blaire wells accurately reflects the 

service I personally performed and the decisions made by me, Santiago Dale MD


Status of Scribe Document: Viewed

## 2019-01-27 NOTE — XMS REPORT
Continuity of Care Document (CCD)

 Created on:2019



Patient:Milton Burton

Sex:Male

:1960

External Reference #:[id number]





Demographics







 Address  [address] [id number]

 

 Home Phone  [phone]

 

 Mobile Phone  [phone]

 

 Work Phone  [phone]

 

 Email Address  [email or web address]

 

 Preferred Language  en

 

 Marital Status  Not  or 

 

 Sikh Affiliation  Unknown

 

 Race  White

 

 Ethnic Group  Not  or 









Author







 Name  Petar Warren M.D.

 

 Address  [address]



   [place] [address]









Support







 Name  Relationship  Address  Phone

 

 Monica Burton  Wife  Unavailable  [phone]









Care Team Providers







 Name  Role  Phone

 

 Harris Wilkerson  Care Team Information   Unavailable

 

 Harris Wilkerson  Primary Care Physician  Unavailable









Payers







 Type  Date  Identification Numbers  Payment Provider  Subscriber

 

   Effective:  Policy Number: [id number]  [employer]  Milton Burton



   2019    Company  









 Group Number: [id number]   [id number]

 

 Group Name: [id number]  [place] [address]









 PayID: [id number]







Advance Directives







 Description

 

 No Information Available







Problems







 Description

 

 No Information







Family History







 Description

 

 No Information Available







Social History







 Type  Date  Description  Comments

 

 Birth Sex    Unknown  

 

 Occupation    property management  

 

 Tobacco Use  Start: Unknown  Current Cigarette Smoker  Smoker of 30+ years.



     1/2 Pack Daily  

 

 Tobacco Use  Start: Unknown  Never Smoked Cigars  

 

 Tobacco Use  Start: Unknown  Never Smoked A Pipe  

 

 Smokeless Tobacco    Never Used Smokeless  



     Tobacco  

 

 ETOH Use    Currently occasionally  



     consumes alcohol  

 

 Tobacco Use  Start: Unknown  Patient is a current  



     smoker, smokes every day  

 

 Smoking Status  Reviewed: 19  Patient is a current  



     smoker, smokes every day  







Allergies, Adverse Reactions, Alerts







 Description

 

 No Known Drug Allergies







Medications







 Medication  Date  Status  Form  Strength  Qnty  SIG  Indications  Ordering



                 Provider

 

 Flovent HFA    Active  Aerosol  220mcg/Act        [place],



   00              Iglesias



                 Ahmad

 

 Fluconazole    Active  Tablets  100mg        [place],



   00              Iglesias



                 Ahmad

 

 Ventolin HFA    Active  Aerosol  108(90Base)        [place],



   00      mcg/Act        Iglesias



                 Ahmad

 

 Mometasone    Active  Suspension  50mcg/Act        [place],



 Furoate  00              Iglesias



                 Ahmad







Immunizations







 Description

 

 No Information Available







Vital Signs







 Date  Vital  Result  Comment

 

 2019  3:34pm  Weight  138.00 lb  









 Weight  62.597 kg  

 

 Height  67 inches  5'7"

 

 Height in cm's  170.2 cm  

 

 BMI (Body Mass Index)  21.6 kg/m2  







Results







 Description

 

 No Information Available







Procedures







 Date  Code  Description  Status

 

 2019  [address]843  Nasal Endoscopy, Diagnostic  Completed







Encounters







 Type  Date  Location  Provider  Dx  Diagnosis

 

 Office Visit  2019  [place],[facility] [place]  Petar VASQUEZ  C31.8  Malignant neoplasm



   3:30p  08  DAVIS Warren    of overlapping



           sites of accessory



           sinuses







Plan of Treatment

2019 - Petar Warren M.D.C31.8 Malignant neoplasm of overlapping 
sites of accessory sinusesNew Xrays:CT Sinus, Ordered: 19Comments:I don't 
know exactly what is going on with the patient but I suspect he has a sinonasal 
malignancy. His nose is obstructed with scabbing, does not have any normal 
anatomy and I cant pass the scope intothe nasopharynx. There are only two 
things I can think of that could present this way, malignancy orWegener's. 
Given his smoking history I suspect the former.   The plan is for a sinus CT 
scan with and without contrast.

## 2019-03-27 ENCOUNTER — HOSPITAL ENCOUNTER (OUTPATIENT)
Dept: HOSPITAL 25 - OR | Age: 59
Discharge: HOME | End: 2019-03-27
Attending: OTOLARYNGOLOGY
Payer: COMMERCIAL

## 2019-03-27 VITALS — DIASTOLIC BLOOD PRESSURE: 97 MMHG | SYSTOLIC BLOOD PRESSURE: 121 MMHG

## 2019-03-27 DIAGNOSIS — G50.1: ICD-10-CM

## 2019-03-27 DIAGNOSIS — F11.11: ICD-10-CM

## 2019-03-27 DIAGNOSIS — J44.9: ICD-10-CM

## 2019-03-27 DIAGNOSIS — J34.81: Primary | ICD-10-CM

## 2019-03-27 DIAGNOSIS — Z72.0: ICD-10-CM

## 2019-03-27 DIAGNOSIS — J32.4: ICD-10-CM

## 2019-03-27 PROCEDURE — 36415 COLL VENOUS BLD VENIPUNCTURE: CPT

## 2019-03-27 PROCEDURE — 80307 DRUG TEST PRSMV CHEM ANLYZR: CPT

## 2019-03-27 NOTE — OP
OPERATIVE REPORT:

 

DATE OF OPERATION:  03/27/19 - SDS

 

DATE OF BIRTH:  [birth year]

 

SURGEON:  Petar Warren MD.



ANESTHESIOLOGIST:  Brenden Larson MD



ANESTHESIA:  General.

 

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 
oxycodone.

 

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 
oxycodone.

 

OPERATIVE PROCEDURE:  Bilateral endoscopic nasal debridement of necrotic tissue 
and septum under general endotracheal anesthesia.

 

COMPLICATIONS:  None.

 

DISPOSITION:  Good.

 

SPECIMENS:  None.

 

BLOOD LOSS:  Minimal.

 

DESCRIPTION OF PROCEDURE:  The patient was taken to the operating room and 
placed in a supine position on the operating room table.  General anesthesia 
was induced and he was orotracheally intubated.  His nose were packed with 
cottonoids with oxymetazoline and 4% lidocaine.  He was draped for the surgery.
  The packs were removed.  He has diffuse necrotic tissue within his nose.  I 
systematically was able to debride this using suction, freer elevator and 
[place].  I was able to elevate it off of the lateral wall, middle turbinate, 
floor of the nose, bony septum.  The cartilaginous septum was necrotic.  I was 
able to debride that back to the bony septum and get viable tissue.  Pretty 
much everywhere there was some exposed bone in the inferior aspect of his bony 
septum that was dusky, unsure whether that is going to survive or not at this 
time.  Other than this area, I had, what looked like, viable bleeding tissue 
throughout the remainder of his nasal cavity including the posterior aspect of 
his soft palate where he had had necrosis of his uvula that I debrided and 
sloughed off on its own.  I medialized the right middle turbinate and looked 
into the maxillary sinus and there was no infection in that sinus.  The other 
ones were not as easily accessible at this time so I did not pursue further 
endoscopic sinus surgery.  I repacked him for 5 minutes, removed the packs, 
suctioned.  He had a little bit of oozing, but no significant active bleeding.  
The patient tolerated this procedure well without any complications, extubated 
uneventfully and transferred to the recovery room in stable condition.

 

 [id number] #: [id number]

[facility]

## 2020-12-08 NOTE — ED
Farshad STEELE Alfonso, scribed for Emily House MD on 07/10/17 at 0828 .





Shortness of Breath





- HPI Summary


HPI Summary: 


This patient is a 56 year [facility] M [facility] with a 

chief complaint of SOB since yesterday. The CC is described as dyspnea at rest 

that began while he was painting a ceiling. Pt rates the pain 0/10 in severity. 

Symptoms aggravated and alleviated by nothing. Pt reports productive cough, CP 

secondary to cough, and diaphoresis. Pt denies fever, abdominal pain, sore 

throat, calf pain, and calf swelling. Pt was given nasal cannula O2 treatment 

prior to physical exam. Tobacco abuse disorder 3 PPD. Per wife, substance abuse 

of non-prescribed Oxycodone. Daily ETOH use.





- History of Current Complaint


Chief Complaint: EDShortnessOfBreath


Time Seen by Provider: 07/10/17 07:51


Hx Obtained From: Patient, Family/Caretaker - wife


Onset/Duration: Sudden Onset, Lasting Days - Yesterday, Still Present


Timing: Constant


Current Severity: Mild


Dyspnea At: Rest


Aggrevating Factors: Nothing


Alleviating Factors: Nothing


Associated Signs & Symptoms: Cough (Productive), Chest Pain w/Cough, Diaphoresis





- Risk Factors


Pulmonary Embolism: Smoking


Cardiac: Smoking





- Allergy/Home Medications


Allergies/Adverse Reactions: 


 Allergies











Allergy/AdvReac Type Severity Reaction Status Date / Time


 


No Known Allergies Allergy   Verified 07/10/13 10:07














PMH/Surg Hx/FS Hx/Imm Hx


Previously Healthy: No


Endocrine/Hematology History: 


   Denies: Hx Diabetes, Hx Thyroid Disease


Cardiovascular History: 


   Denies: Hx Hypertension


Respiratory History: 


   Denies: Hx Asthma, Hx Chronic Obstructive Pulmonary Disease (COPD)


GI History: 


   Denies: Hx Ulcer


Infectious Disease History: No


Infectious Disease History: 


   Denies: Hx Hepatitis, Hx Human Immunodeficiency Virus (HIV), Traveled 

Outside the [place] in Last 30 Days





- Family History


Known Family History: Positive: Other - Emphysema





- Social History


Lives: With Family


Alcohol Use: Daily


Substance Use Type: Reports: Other


Substance Use Comment - Amount & Last Used: non prescribed oxy per wife


Hx Tobacco Use: Yes


Smoking Status (MU): Heavy Every Day Tobacco Smoker





Review of Systems


Positive: Skin Diaphoresis.  Negative: Fever


Negative: Sore Throat


Positive: Chest Pain - secondary to cough


Positive: Shortness Of Breath, Cough - Productive, Other - Dyspnea at rest


Negative: Abdominal Pain


Positive: Other - Negative calf pain and calf swelling.


Skin: Negative


Neurological: Negative


Psychological: Normal


All Other Systems Reviewed And Are Negative: Yes





Physical Exam


Triage Information Reviewed: Yes


Vital Signs On Initial Exam: 


 Initial Vitals











Temp Pulse Resp BP Pulse Ox


 


 97.6 F   99   22   128/78   92 


 


 07/10/17 07:15  07/10/17 07:15  07/10/17 07:15  07/10/17 07:15  07/10/17 07:15











Vital Signs Reviewed: Yes


Appearance: Positive: No Pain Distress, Well-Nourished, Ill-Appearing


Skin: Positive: Warm, Skin Color Reflects Adequate Perfusion


Head/Face: Positive: Normal Head/Face Inspection


Eyes: Positive: Conjunctiva Clear


ENT: Positive: Normal ENT inspection


Neck: Positive: Supple, Nontender, No Lymphadenopathy


Respiratory/Lung Sounds: Positive: Breath Sounds Present, Decreased Breath 

Sounds, Wheezes, Other - Wheezing exhale and inhale. Accessory muscles used for 

respiration


Cardiovascular: Positive: RRR, Pulses are Symmetrical in both Upper and Lower 

Extremities.  Negative: Murmur, Leg Edema Left, Leg Edema Right


Abdomen Description: Positive: Nontender, No Organomegaly, Soft


Bowel Sounds: Positive: Present


Musculoskeletal: Positive: Strength/ROM Intact


Neurological: Positive: Sensory/Motor Intact, Alert, Oriented to Person Place, 

Time, CN Intact II-III, Speech Normal


Psychiatric: Positive: Normal





- Geovany Coma Scale


Coma Scale Total: 15





Diagnostics





- Vital Signs


 Vital Signs











  Temp Pulse Resp BP Pulse Ox


 


 07/10/17 08:11      93


 


 07/10/17 08:00   89   149/79  94


 


 07/10/17 07:30   90   146/81  92


 


 07/10/17 07:27   95    91


 


 07/10/17 07:24     149/82 


 


 07/10/17 07:23    26  


 


 07/10/17 07:17  97.6 F  107  22  128/78  92


 


 07/10/17 07:15  97.6 F  99  22  128/78  92














- Laboratory


Lab Results: 


 Lab Results











  07/10/17 07/10/17 07/10/17 Range/Units





  08:06 08:06 08:06 


 


WBC  17.4 H    (3.5-10.8)  10^3/ul


 


RBC  5.72 H    (4.0-5.4)  10^6/ul


 


Hgb  17.2    (14.0-18.0)  g/dl


 


Hct  51    (42-52)  %


 


MCV  90    (80-94)  fL


 


MCH  30    (27-31)  pg


 


MCHC  33    (31-36)  g/dl


 


RDW  13    (10.5-15)  %


 


Plt Count  194    (150-450)  10^3/ul


 


MPV  9    (7.4-10.4)  um3


 


Neut % (Auto)  89.0 H    (38-83)  %


 


Lymph % (Auto)  4.6 L    (25-47)  %


 


Mono % (Auto)  6.0    (1-9)  %


 


Eos % (Auto)  0    (0-6)  %


 


Baso % (Auto)  0.4    (0-2)  %


 


Absolute Neuts (auto)  15.5 H    (1.5-7.7)  10^3/ul


 


Absolute Lymphs (auto)  0.8 L    (1.0-4.8)  10^3/ul


 


Absolute Monos (auto)  1.0 H    (0-0.8)  10^3/ul


 


Absolute Eos (auto)  0    (0-0.6)  10^3/ul


 


Absolute Basos (auto)  0.1    (0-0.2)  10^3/ul


 


Absolute Nucleated RBC  0    10^3/ul


 


Nucleated RBC %  0    


 


INR (Anticoag Therapy)   1.01   (0.89-1.11)  


 


APTT   37.0 H   (26.0-36.3)  seconds


 


D-Dimer, Quantitative   382 H   (Less Than 230)  ng/mL


 


Sodium    135  (133-145)  mmol/L


 


Potassium    4.0  (3.5-5.0)  mmol/L


 


Chloride    98 L  (101-111)  mmol/L


 


Carbon Dioxide    29  (22-32)  mmol/L


 


Anion Gap    8  (2-11)  mmol/L


 


BUN    13  (6-24)  mg/dL


 


Creatinine    0.78  (0.67-1.17)  mg/dL


 


Est GFR ( Amer)    133.4  (>60)  


 


Est GFR (Non-Af Amer)    103.7  (>60)  


 


BUN/Creatinine Ratio    16.7  (8-20)  


 


Glucose    139 H  ()  mg/dL


 


Lactic Acid     (0.5-2.0)  mmol/L


 


Calcium    9.5  (8.6-10.3)  mg/dL


 


Total Bilirubin    1.20 H  (0.2-1.0)  mg/dL


 


AST    26  (13-39)  U/L


 


ALT    17  (7-52)  U/L


 


Alkaline Phosphatase    80  ()  U/L


 


Total Creatine Kinase    237 H  ()  U/L


 


CK-MB (CK-2)    9.8 H  (0.6-6.3)  ng/mL


 


Troponin I    0.01  (<0.04)  ng/mL


 


C-Reactive Protein    122.79 H  (< 5.00)  mg/L


 


B-Natriuretic Peptide    ( - 100) pg/mL


 


Total Protein    7.4  (6.4-8.9)  g/dL


 


Albumin    4.1  (3.2-5.2)  g/dL


 


Globulin    3.3  (2-4)  g/dL


 


Albumin/Globulin Ratio    1.2  (1-3)  














  07/10/17 07/10/17 Range/Units





  08:06 08:06 


 


WBC    (3.5-10.8)  10^3/ul


 


RBC    (4.0-5.4)  10^6/ul


 


Hgb    (14.0-18.0)  g/dl


 


Hct    (42-52)  %


 


MCV    (80-94)  fL


 


MCH    (27-31)  pg


 


MCHC    (31-36)  g/dl


 


RDW    (10.5-15)  %


 


Plt Count    (150-450)  10^3/ul


 


MPV    (7.4-10.4)  um3


 


Neut % (Auto)    (38-83)  %


 


Lymph % (Auto)    (25-47)  %


 


Mono % (Auto)    (1-9)  %


 


Eos % (Auto)    (0-6)  %


 


Baso % (Auto)    (0-2)  %


 


Absolute Neuts (auto)    (1.5-7.7)  10^3/ul


 


Absolute Lymphs (auto)    (1.0-4.8)  10^3/ul


 


Absolute Monos (auto)    (0-0.8)  10^3/ul


 


Absolute Eos (auto)    (0-0.6)  10^3/ul


 


Absolute Basos (auto)    (0-0.2)  10^3/ul


 


Absolute Nucleated RBC    10^3/ul


 


Nucleated RBC %    


 


INR (Anticoag Therapy)    (0.89-1.11)  


 


APTT    (26.0-36.3)  seconds


 


D-Dimer, Quantitative    (Less Than 230)  ng/mL


 


Sodium    (133-145)  mmol/L


 


Potassium    (3.5-5.0)  mmol/L


 


Chloride    (101-111)  mmol/L


 


Carbon Dioxide    (22-32)  mmol/L


 


Anion Gap    (2-11)  mmol/L


 


BUN    (6-24)  mg/dL


 


Creatinine    (0.67-1.17)  mg/dL


 


Est GFR ( Amer)    (>60)  


 


Est GFR (Non-Af Amer)    (>60)  


 


BUN/Creatinine Ratio    (8-20)  


 


Glucose    ()  mg/dL


 


Lactic Acid  1.6   (0.5-2.0)  mmol/L


 


Calcium    (8.6-10.3)  mg/dL


 


Total Bilirubin    (0.2-1.0)  mg/dL


 


AST    (13-39)  U/L


 


ALT    (7-52)  U/L


 


Alkaline Phosphatase    ()  U/L


 


Total Creatine Kinase    ()  U/L


 


CK-MB (CK-2)    (0.6-6.3)  ng/mL


 


Troponin I    (<0.04)  ng/mL


 


C-Reactive Protein    (< 5.00)  mg/L


 


B-Natriuretic Peptide   96 ( - 100) pg/mL


 


Total Protein    (6.4-8.9)  g/dL


 


Albumin    (3.2-5.2)  g/dL


 


Globulin    (2-4)  g/dL


 


Albumin/Globulin Ratio    (1-3)  











Result Diagrams: 


 07/10/17 08:06





 07/10/17 08:06


Lab Statement: Any lab studies that have been ordered have been reviewed, and 

results considered in the medical decision making process.





- Radiology


  ** CXR


Radiology Interpretation Completed By: Radiologist - Stigmata of obstructive 

lung disease. No acute pulmonary or cardiac process evident.





- CT


  ** CTA Chest/Thorax


CT Interpretation Completed By: Radiologist - No evidence of pulmonary embolus 

is noted.





- EKG


  ** 0757


Cardiac Rate: NL - BPM 84


EKG Rhythm: Sinus Rhythm


EKG Interpretation: Normal IV and AV conduction times. Normal QTc. Negative 

axis -61


EKG Comparison: Other - No prior EKG to compare.





Re-Evaluation





- Re-Evaluation


  ** First Eval


Re-Evaluation Time: 14:00


Change: Improved


Comment: Pt symptoms have improved in the ED course and labs were reviewed. Pt 

understands. Pt agrees with discharge plan.





Course/Dx





- Course


Assessment/Plan: 56 year old M presents to the ED with a CC of SOB since 

yesterday. Pt reports dyspnea at rest, productive cough, CP secondary to cough, 

and diaphoresis. Pt denies fever, abdominal pain, sore throat, calf pain, and 

calf swelling. CXR reveals stigmata of obstructive lung disease. No acute 

pulmonary or cardiac process evident. CTA Chest/Thorax reveals no evidence of 

pulmonary embolus. An EKG reveals NSR. Patient will be discharged on doxycycline

, prednisone 40mg x 5 days, albuterol inhaler and steroid inhaler, with follow 

up from Dr. Wilkerson (PCP). Pt is agreeable with this plan. Pt medications 

reviewed this visit.





- Diagnoses


Differential Diagnosis/HQI/PQRI: Positive: Bronchitis, COPD Exacerbation, MI, 

Pneumonia, Pulmonary Embolism


Provider Diagnoses: 


 Acute bronchitis, COPD exacerbation, Tobacco abuse disorder








Discharge





- Discharge Plan


Condition: Stable


Disposition: HOME


Prescriptions: 


Albuterol HFA INHALER* [Ventolin HFA Inhaler*] 2 puff INH Q4H PRN #1 mdi


 PRN Reason: Sob/Wheezing


DOXYcycline CAP(*) [DOXYcycline 100MG CAP(*)] 100 mg PO BID #20 cap


Fluticasone  mcg(NF) [Flovent  Mcg(NF)] 1 puff INH BID #1 mdi


predniSONE TAB* [Deltasone TAB*] 40 mg PO DAILY #10 tab


Patient Education Materials:  How to Stop Smoking (ED), Acute Bronchitis (ED), 

COPD (Chronic Obstructive Pulmonary Disease) (ED)


Referrals: 


Harris Wilkerson MD [Primary Care Provider] - 2 Days


Additional Instructions: 


Return to the ER if you have any new or worsening symptoms.





The documentation as recorded by the Farshad wells Alfonso accurately reflects 

the service I personally performed and the decisions made by me, Emily House MD.
Progress





- Progress Note


Progress Note: 





Pt's sputum cx reveals h. influenzae - pt started on doxycycline which tends to 

be less effective than cephalosporins, macrolides and fluouroquinolones. 

Attempeted to contact pt re: sx - no answer, no option to leave message. Will 

forward results to PCP. Berta, zhou clerk, aware.





Re-Evaluation





- Re-Evaluation


  ** First Eval


Re-Evaluation Time: 14:00


Change: Improved


Comment: Pt symptoms have improved in the ED course and labs were reviewed. Pt 

understands. Pt agrees with discharge plan.





Course/Dx





- Diagnoses


Provider Diagnoses: 


 Acute bronchitis, COPD exacerbation, Tobacco abuse disorder
soft/no rebound tenderness/no rigidity/no distention/no masses palpable/no guarding/nontender/bowel sounds normal/no organomegaly